# Patient Record
Sex: MALE | Race: WHITE | Employment: OTHER | URBAN - METROPOLITAN AREA
[De-identification: names, ages, dates, MRNs, and addresses within clinical notes are randomized per-mention and may not be internally consistent; named-entity substitution may affect disease eponyms.]

---

## 2017-06-26 ENCOUNTER — HOSPITAL ENCOUNTER (OUTPATIENT)
Dept: GENERAL RADIOLOGY | Age: 62
Discharge: HOME | End: 2017-06-26
Payer: COMMERCIAL

## 2017-06-26 DIAGNOSIS — M54.2 NECK PAIN: ICD-10-CM

## 2017-06-26 PROCEDURE — 72050 X-RAY EXAM NECK SPINE 4/5VWS: CPT

## 2023-05-10 DIAGNOSIS — M54.50 LOW BACK PAIN, UNSPECIFIED BACK PAIN LATERALITY, UNSPECIFIED CHRONICITY, UNSPECIFIED WHETHER SCIATICA PRESENT: ICD-10-CM

## 2023-05-10 PROBLEM — M48.061 LUMBAR STENOSIS: Status: ACTIVE | Noted: 2023-05-10

## 2023-05-10 PROBLEM — M54.16 LUMBAR RADICULOPATHY: Status: ACTIVE | Noted: 2023-05-10

## 2023-05-10 RX ORDER — MELOXICAM 15 MG/1
1 TABLET ORAL DAILY
COMMUNITY
End: 2023-05-10 | Stop reason: SDUPTHER

## 2023-05-10 RX ORDER — MELOXICAM 15 MG/1
15 TABLET ORAL DAILY
Qty: 30 TABLET | Refills: 0 | Status: SHIPPED | OUTPATIENT
Start: 2023-05-10 | End: 2023-08-18

## 2023-07-06 ENCOUNTER — HOSPITAL ENCOUNTER (OUTPATIENT)
Dept: DATA CONVERSION | Facility: HOSPITAL | Age: 68
End: 2023-07-06
Attending: PAIN MEDICINE | Admitting: PAIN MEDICINE
Payer: MEDICARE

## 2023-07-06 DIAGNOSIS — M54.16 RADICULOPATHY, LUMBAR REGION: ICD-10-CM

## 2023-07-28 ENCOUNTER — OFFICE VISIT (OUTPATIENT)
Dept: PRIMARY CARE | Facility: CLINIC | Age: 68
End: 2023-07-28
Payer: MEDICARE

## 2023-07-28 VITALS
BODY MASS INDEX: 32.37 KG/M2 | WEIGHT: 239 LBS | DIASTOLIC BLOOD PRESSURE: 76 MMHG | SYSTOLIC BLOOD PRESSURE: 120 MMHG | OXYGEN SATURATION: 93 % | HEIGHT: 72 IN | HEART RATE: 63 BPM

## 2023-07-28 DIAGNOSIS — I10 ESSENTIAL HYPERTENSION: ICD-10-CM

## 2023-07-28 DIAGNOSIS — I77.810 AORTIC ROOT DILATATION (CMS-HCC): ICD-10-CM

## 2023-07-28 DIAGNOSIS — C67.9 MALIGNANT NEOPLASM OF URINARY BLADDER, UNSPECIFIED SITE (MULTI): ICD-10-CM

## 2023-07-28 DIAGNOSIS — E78.5 HYPERLIPIDEMIA, UNSPECIFIED HYPERLIPIDEMIA TYPE: Primary | ICD-10-CM

## 2023-07-28 PROBLEM — E29.1 HYPOGONADISM, MALE: Status: ACTIVE | Noted: 2023-07-28

## 2023-07-28 PROBLEM — B00.9 HERPES SIMPLEX: Status: ACTIVE | Noted: 2023-07-28

## 2023-07-28 PROBLEM — K21.9 GASTROESOPHAGEAL REFLUX DISEASE: Status: ACTIVE | Noted: 2023-07-28

## 2023-07-28 PROBLEM — H35.52 RETINITIS PIGMENTOSA: Status: ACTIVE | Noted: 2023-07-28

## 2023-07-28 PROBLEM — H54.8 LEGAL BLINDNESS, AS DEFINED IN USA: Status: ACTIVE | Noted: 2023-07-28

## 2023-07-28 PROBLEM — H26.9 CATARACT, BILATERAL: Status: ACTIVE | Noted: 2023-07-28

## 2023-07-28 PROBLEM — I35.1 NONRHEUMATIC AORTIC VALVE INSUFFICIENCY: Status: ACTIVE | Noted: 2023-07-28

## 2023-07-28 PROBLEM — M47.816 DEGENERATIVE ARTHRITIS OF LUMBAR SPINE: Status: ACTIVE | Noted: 2023-07-28

## 2023-07-28 PROBLEM — N52.9 ERECTILE DYSFUNCTION: Status: ACTIVE | Noted: 2023-07-28

## 2023-07-28 PROBLEM — H33.309 TEAR OF RETINA WITHOUT DETACHMENT: Status: ACTIVE | Noted: 2023-07-28

## 2023-07-28 PROBLEM — N32.89 BLADDER MASS: Status: ACTIVE | Noted: 2023-07-28

## 2023-07-28 PROBLEM — M17.12 LEFT KNEE DJD: Status: ACTIVE | Noted: 2023-07-28

## 2023-07-28 PROBLEM — K44.9 HIATAL HERNIA: Status: ACTIVE | Noted: 2023-07-28

## 2023-07-28 PROBLEM — G03.9 MENINGITIS (HHS-HCC): Status: ACTIVE | Noted: 2023-07-28

## 2023-07-28 PROBLEM — R55 SYNCOPE: Status: ACTIVE | Noted: 2023-07-28

## 2023-07-28 PROBLEM — I51.9 MILD DIASTOLIC DYSFUNCTION: Status: ACTIVE | Noted: 2023-07-28

## 2023-07-28 PROBLEM — N40.0 BPH (BENIGN PROSTATIC HYPERPLASIA): Status: ACTIVE | Noted: 2023-07-28

## 2023-07-28 PROCEDURE — 3078F DIAST BP <80 MM HG: CPT | Performed by: FAMILY MEDICINE

## 2023-07-28 PROCEDURE — 1159F MED LIST DOCD IN RCRD: CPT | Performed by: FAMILY MEDICINE

## 2023-07-28 PROCEDURE — 3074F SYST BP LT 130 MM HG: CPT | Performed by: FAMILY MEDICINE

## 2023-07-28 PROCEDURE — 99214 OFFICE O/P EST MOD 30 MIN: CPT | Performed by: FAMILY MEDICINE

## 2023-07-28 PROCEDURE — 1126F AMNT PAIN NOTED NONE PRSNT: CPT | Performed by: FAMILY MEDICINE

## 2023-07-28 RX ORDER — ALFUZOSIN HYDROCHLORIDE 10 MG/1
1 TABLET, EXTENDED RELEASE ORAL DAILY
COMMUNITY
Start: 2023-02-07 | End: 2023-11-07 | Stop reason: SDUPTHER

## 2023-07-28 RX ORDER — NITROFURANTOIN MACROCRYSTALS 50 MG/1
CAPSULE ORAL EVERY 12 HOURS
COMMUNITY
Start: 2023-01-27 | End: 2024-02-07 | Stop reason: WASHOUT

## 2023-07-28 RX ORDER — PRAVASTATIN SODIUM 10 MG/1
1 TABLET ORAL NIGHTLY
COMMUNITY
Start: 2023-02-01 | End: 2024-02-22 | Stop reason: SDUPTHER

## 2023-07-28 RX ORDER — AMLODIPINE BESYLATE 5 MG/1
5 TABLET ORAL 2 TIMES DAILY
COMMUNITY
End: 2024-05-22 | Stop reason: SDUPTHER

## 2023-07-28 RX ORDER — LISINOPRIL 20 MG/1
20 TABLET ORAL 2 TIMES DAILY
Qty: 180 TABLET | Refills: 3
Start: 2023-07-28 | End: 2024-02-07 | Stop reason: WASHOUT

## 2023-07-28 RX ORDER — EZETIMIBE 10 MG/1
10 TABLET ORAL DAILY
COMMUNITY
End: 2023-08-25 | Stop reason: SDUPTHER

## 2023-07-28 RX ORDER — CHLORTHALIDONE 25 MG/1
TABLET ORAL DAILY
COMMUNITY
Start: 2022-08-01 | End: 2024-02-07 | Stop reason: WASHOUT

## 2023-07-28 RX ORDER — SILDENAFIL 100 MG/1
1 TABLET, FILM COATED ORAL
COMMUNITY
Start: 2014-09-22

## 2023-07-28 RX ORDER — ALBUTEROL SULFATE 90 UG/1
2 AEROSOL, METERED RESPIRATORY (INHALATION) EVERY 4 HOURS PRN
COMMUNITY
Start: 2022-12-07

## 2023-07-28 RX ORDER — PREDNISONE 20 MG/1
TABLET ORAL
COMMUNITY
Start: 2022-12-07 | End: 2023-07-28

## 2023-07-28 RX ORDER — TIZANIDINE 4 MG/1
4 TABLET ORAL 3 TIMES DAILY
COMMUNITY
Start: 2022-12-04 | End: 2024-02-07 | Stop reason: WASHOUT

## 2023-07-28 RX ORDER — OMEPRAZOLE 20 MG/1
20 CAPSULE, DELAYED RELEASE ORAL
COMMUNITY
End: 2023-11-20 | Stop reason: SDUPTHER

## 2023-07-28 RX ORDER — ASPIRIN 81 MG/1
TABLET ORAL
COMMUNITY
Start: 2022-05-16

## 2023-07-28 RX ORDER — LISINOPRIL 40 MG/1
40 TABLET ORAL DAILY
COMMUNITY
End: 2023-07-28 | Stop reason: SDUPTHER

## 2023-07-28 ASSESSMENT — ENCOUNTER SYMPTOMS
BACK PAIN: 1
FEVER: 0
HYPERTENSION: 1
COUGH: 0

## 2023-07-28 NOTE — PROGRESS NOTES
Subjective   Patient ID: Florentino Mccall is a 67 y.o. male who presents for Hypertension.    Hypertension  This is a recurrent problem. The current episode started more than 1 month ago. The problem is unchanged. Pertinent negatives include no chest pain. There are no compliance problems.         Here today for follow-up on hypertension hyperlipidemia  Currently taking lisinopril 20 mg twice daily and amlodipine 5 mg twice daily.  He monitors his blood pressure regularly out of the office.  BP readings can be variable, but most recent reading was 125/74.  He will sometimes not take his second dose of amlodipine when his blood pressure is on the lower side  He is currently taking ezetimibe and pravastatin 10 mg daily for hyperlipidemia.  His last lipid panel done 1/31/2023 showed total cholesterol 220 and .  He was started on pravastatin at that time.  He had previously taken atorvastatin and rosuvastatin, but these medications were stopped because he had developed muscle cramping when taking  He is following with pain management for chronic low back pain.  He received an injection in his lower back earlier this month which helped    Review of Systems   Constitutional:  Negative for fever.   Respiratory:  Negative for cough.    Cardiovascular:  Negative for chest pain.   Musculoskeletal:  Positive for back pain.       Objective   /76   Pulse 63   Ht 1.829 m (6')   Wt 108 kg (239 lb)   SpO2 93%   BMI 32.41 kg/m²     Physical Exam  Vitals reviewed.   Constitutional:       General: He is not in acute distress.     Appearance: Normal appearance. He is well-developed.   HENT:      Head: Normocephalic.   Eyes:      Conjunctiva/sclera: Conjunctivae normal.   Cardiovascular:      Rate and Rhythm: Normal rate and regular rhythm.      Heart sounds: Normal heart sounds.   Pulmonary:      Effort: Pulmonary effort is normal.      Breath sounds: Normal breath sounds.   Musculoskeletal:      Right lower leg: No edema.       Left lower leg: No edema.   Skin:     Findings: No rash.   Neurological:      Mental Status: He is alert.   Psychiatric:         Mood and Affect: Mood normal.         Behavior: Behavior normal.         Assessment/Plan   Problem List Items Addressed This Visit          Medium    Aortic root dilatation (CMS/HCC)     Follows with cardiology         Essential hypertension    Relevant Medications    lisinopril 20 mg tablet    Hyperlipidemia - Primary    Relevant Orders    Lipid Panel    Comprehensive Metabolic Panel    Malignant tumor of urinary bladder (CMS/HCC)     Follows with urology        #1 hypertension: This is stable and adequately controlled on lisinopril and amlodipine.  Continue current dose and follow-up in January when due for next annual Medicare physical.  Blood pressures well controlled today at 120/76  2.  Hyperlipidemia: His last lipid panel was suboptimal, and he has since been started on pravastatin.  We will recheck a lipid panel.  Continue ezetimibe and pravastatin at current dose

## 2023-08-09 ENCOUNTER — LAB (OUTPATIENT)
Dept: LAB | Facility: LAB | Age: 68
End: 2023-08-09
Payer: MEDICARE

## 2023-08-09 DIAGNOSIS — E78.5 HYPERLIPIDEMIA, UNSPECIFIED HYPERLIPIDEMIA TYPE: ICD-10-CM

## 2023-08-09 LAB
ALANINE AMINOTRANSFERASE (SGPT) (U/L) IN SER/PLAS: 29 U/L (ref 10–52)
ALBUMIN (G/DL) IN SER/PLAS: 4.7 G/DL (ref 3.4–5)
ALKALINE PHOSPHATASE (U/L) IN SER/PLAS: 42 U/L (ref 33–136)
ANION GAP IN SER/PLAS: 15 MMOL/L (ref 10–20)
ASPARTATE AMINOTRANSFERASE (SGOT) (U/L) IN SER/PLAS: 20 U/L (ref 9–39)
BILIRUBIN TOTAL (MG/DL) IN SER/PLAS: 0.7 MG/DL (ref 0–1.2)
CALCIUM (MG/DL) IN SER/PLAS: 10 MG/DL (ref 8.6–10.3)
CARBON DIOXIDE, TOTAL (MMOL/L) IN SER/PLAS: 23 MMOL/L (ref 21–32)
CHLORIDE (MMOL/L) IN SER/PLAS: 106 MMOL/L (ref 98–107)
CHOLESTEROL (MG/DL) IN SER/PLAS: 176 MG/DL (ref 0–199)
CHOLESTEROL IN HDL (MG/DL) IN SER/PLAS: 58.4 MG/DL
CHOLESTEROL/HDL RATIO: 3
CREATININE (MG/DL) IN SER/PLAS: 1.19 MG/DL (ref 0.5–1.3)
GFR MALE: 67 ML/MIN/1.73M2
GLUCOSE (MG/DL) IN SER/PLAS: 103 MG/DL (ref 74–99)
LDL: 105 MG/DL (ref 0–99)
POTASSIUM (MMOL/L) IN SER/PLAS: 4.6 MMOL/L (ref 3.5–5.3)
PROTEIN TOTAL: 7.1 G/DL (ref 6.4–8.2)
SODIUM (MMOL/L) IN SER/PLAS: 139 MMOL/L (ref 136–145)
TRIGLYCERIDE (MG/DL) IN SER/PLAS: 64 MG/DL (ref 0–149)
UREA NITROGEN (MG/DL) IN SER/PLAS: 24 MG/DL (ref 6–23)
VLDL: 13 MG/DL (ref 0–40)

## 2023-08-09 PROCEDURE — 80061 LIPID PANEL: CPT

## 2023-08-09 PROCEDURE — 80053 COMPREHEN METABOLIC PANEL: CPT

## 2023-08-09 PROCEDURE — 36415 COLL VENOUS BLD VENIPUNCTURE: CPT

## 2023-08-11 ENCOUNTER — TELEPHONE (OUTPATIENT)
Dept: PRIMARY CARE | Facility: CLINIC | Age: 68
End: 2023-08-11
Payer: MEDICARE

## 2023-08-11 NOTE — TELEPHONE ENCOUNTER
Pt returned call I relayed lab results and pt states would like a call back to discuss his kidney levels.

## 2023-08-18 DIAGNOSIS — M54.50 LOW BACK PAIN, UNSPECIFIED BACK PAIN LATERALITY, UNSPECIFIED CHRONICITY, UNSPECIFIED WHETHER SCIATICA PRESENT: ICD-10-CM

## 2023-08-18 RX ORDER — MELOXICAM 15 MG/1
15 TABLET ORAL DAILY
Qty: 30 TABLET | Refills: 0 | Status: SHIPPED | OUTPATIENT
Start: 2023-08-18 | End: 2023-09-18

## 2023-08-18 NOTE — TELEPHONE ENCOUNTER
Rx Refill Request Telephone Encounter    Name:  Florentino Mccall  :  [id number]  Medication Name:  Meloxicam 15 mg

## 2023-08-25 DIAGNOSIS — E78.5 HYPERLIPIDEMIA, UNSPECIFIED HYPERLIPIDEMIA TYPE: ICD-10-CM

## 2023-08-25 RX ORDER — EZETIMIBE 10 MG/1
10 TABLET ORAL DAILY
Qty: 90 TABLET | Refills: 3 | Status: SHIPPED | OUTPATIENT
Start: 2023-08-25

## 2023-09-18 DIAGNOSIS — M54.50 LOW BACK PAIN, UNSPECIFIED BACK PAIN LATERALITY, UNSPECIFIED CHRONICITY, UNSPECIFIED WHETHER SCIATICA PRESENT: ICD-10-CM

## 2023-09-18 RX ORDER — MELOXICAM 15 MG/1
15 TABLET ORAL DAILY
Qty: 30 TABLET | Refills: 0 | Status: SHIPPED | OUTPATIENT
Start: 2023-09-18 | End: 2023-10-26

## 2023-09-29 VITALS — BODY MASS INDEX: 32.58 KG/M2 | WEIGHT: 240.52 LBS | HEIGHT: 72 IN

## 2023-10-02 NOTE — OP NOTE
PROCEDURE DETAILS    Preoperative Diagnosis:  Lumbar radicular pain, M54.16    Postoperative Diagnosis:  Lumbar radicular pain, M54.16    Surgeon: Jyoti Joshua  Resident/Fellow/Other Assistant: Mitali Espinoza    Procedure:  Lumbar epidural steroid injection under fluoroscopic guidance     Anesthesia: No anesthesiologist associated with this case  Estimated Blood Loss: 0  Findings: None         Operative Report:       Operation  Midline lumbar epidural steroid injection. Level performed L5-s1    Surgical indications  The patient is here today to receive an epidural steroid injection to assist with pain.    Operative report  The patient was taken to the procedure room, was placed into a prone positioning. The lumbar area was prepped and draped sterilely in the normal fashion. Under fluoroscopic guidance the epidural space was identified. The skin and subcutaneous tissue was  anesthetized with 1% lidocaine. An 18-gauge Tuohy needle was introduced using the normal saline loss-of-resistance technique. Once the loss of resistance had been achieved, final positioning of the needle was confirmed with negative aspiration of heme  and CSF, with the injection of contrast material showing spread in the epidural space, confirmed in AP and lateral view. Then the patient received 80 mg of Depo-Medrol diluted into normal saline preservative-free and 2 mL of 0.25% bupivacaine making total  volume of 8 mL. The patient tolerated the procedure well, was taken to the recovery room, allowed to recover sufficient amount of time and then was discharged home in stable condition. The patient was advised to [facility]up with [facility]  to reassess improvement on as-needed basis.    Thank you for allowing me to participate in the care of this patient.                        Attestation:   Note Completion:  Attending Attestation I performed the procedure without a resident         Electronic Signatures:  Tres  Jyoti ANTHONY)  (Signed 06-Jul-2023 11:08)   Authored: Post-Operative Note, Chart Review, Note Completion      Last Updated: 06-Jul-2023 11:08 by Jyoti Joshua)

## 2023-10-11 ENCOUNTER — HOSPITAL ENCOUNTER (OUTPATIENT)
Dept: RADIOLOGY | Facility: HOSPITAL | Age: 68
Discharge: HOME | End: 2023-10-11
Payer: MEDICARE

## 2023-10-11 ENCOUNTER — LAB (OUTPATIENT)
Dept: LAB | Facility: LAB | Age: 68
End: 2023-10-11
Payer: MEDICARE

## 2023-10-11 DIAGNOSIS — R31.9 HEMATURIA, UNSPECIFIED: ICD-10-CM

## 2023-10-11 DIAGNOSIS — N40.0 BENIGN PROSTATIC HYPERPLASIA WITHOUT LOWER URINARY TRACT SYMPTOMS: ICD-10-CM

## 2023-10-11 DIAGNOSIS — R31.9 HEMATURIA, UNSPECIFIED: Primary | ICD-10-CM

## 2023-10-11 DIAGNOSIS — C67.9 MALIGNANT NEOPLASM OF BLADDER, UNSPECIFIED (MULTI): ICD-10-CM

## 2023-10-11 LAB
ALBUMIN SERPL BCP-MCNC: 4.8 G/DL (ref 3.4–5)
ALP SERPL-CCNC: 43 U/L (ref 33–136)
ALT SERPL W P-5'-P-CCNC: 31 U/L (ref 10–52)
ANION GAP SERPL CALC-SCNC: 11 MMOL/L (ref 10–20)
APPEARANCE UR: CLEAR
AST SERPL W P-5'-P-CCNC: 21 U/L (ref 9–39)
BILIRUB SERPL-MCNC: 0.8 MG/DL (ref 0–1.2)
BILIRUB UR STRIP.AUTO-MCNC: NEGATIVE MG/DL
BUN SERPL-MCNC: 21 MG/DL (ref 6–23)
CALCIUM SERPL-MCNC: 9.9 MG/DL (ref 8.6–10.3)
CHLORIDE SERPL-SCNC: 106 MMOL/L (ref 98–107)
CO2 SERPL-SCNC: 28 MMOL/L (ref 21–32)
COLOR UR: YELLOW
CREAT SERPL-MCNC: 1.16 MG/DL (ref 0.5–1.3)
ERYTHROCYTE [DISTWIDTH] IN BLOOD BY AUTOMATED COUNT: 12.8 % (ref 11.5–14.5)
GFR SERPL CREATININE-BSD FRML MDRD: 70 ML/MIN/1.73M*2
GLUCOSE SERPL-MCNC: 103 MG/DL (ref 74–99)
GLUCOSE UR STRIP.AUTO-MCNC: NEGATIVE MG/DL
HCT VFR BLD AUTO: 46.3 % (ref 41–52)
HGB BLD-MCNC: 15.4 G/DL (ref 13.5–17.5)
KETONES UR STRIP.AUTO-MCNC: NEGATIVE MG/DL
LEUKOCYTE ESTERASE UR QL STRIP.AUTO: NEGATIVE
MCH RBC QN AUTO: 29.3 PG (ref 26–34)
MCHC RBC AUTO-ENTMCNC: 33.3 G/DL (ref 32–36)
MCV RBC AUTO: 88 FL (ref 80–100)
NITRITE UR QL STRIP.AUTO: NEGATIVE
NRBC BLD-RTO: 0 /100 WBCS (ref 0–0)
PH UR STRIP.AUTO: 6 [PH]
PLATELET # BLD AUTO: 211 X10*3/UL (ref 150–450)
PMV BLD AUTO: 9.9 FL (ref 7.5–11.5)
POTASSIUM SERPL-SCNC: 5.1 MMOL/L (ref 3.5–5.3)
PROT SERPL-MCNC: 6.8 G/DL (ref 6.4–8.2)
PROT UR STRIP.AUTO-MCNC: NEGATIVE MG/DL
PSA SERPL-MCNC: 0.82 NG/ML
RBC # BLD AUTO: 5.26 X10*6/UL (ref 4.5–5.9)
RBC # UR STRIP.AUTO: NEGATIVE /UL
SODIUM SERPL-SCNC: 140 MMOL/L (ref 136–145)
SP GR UR STRIP.AUTO: 1.01
UROBILINOGEN UR STRIP.AUTO-MCNC: <2 MG/DL
WBC # BLD AUTO: 5.8 X10*3/UL (ref 4.4–11.3)

## 2023-10-11 PROCEDURE — 88112 CYTOPATH CELL ENHANCE TECH: CPT | Performed by: PATHOLOGY

## 2023-10-11 PROCEDURE — 76770 US EXAM ABDO BACK WALL COMP: CPT | Performed by: RADIOLOGY

## 2023-10-11 PROCEDURE — 36415 COLL VENOUS BLD VENIPUNCTURE: CPT

## 2023-10-11 PROCEDURE — 87086 URINE CULTURE/COLONY COUNT: CPT

## 2023-10-11 PROCEDURE — 85027 COMPLETE CBC AUTOMATED: CPT

## 2023-10-11 PROCEDURE — 81003 URINALYSIS AUTO W/O SCOPE: CPT

## 2023-10-11 PROCEDURE — 76770 US EXAM ABDO BACK WALL COMP: CPT

## 2023-10-11 PROCEDURE — 88112 CYTOPATH CELL ENHANCE TECH: CPT

## 2023-10-11 PROCEDURE — 84153 ASSAY OF PSA TOTAL: CPT

## 2023-10-11 PROCEDURE — 80053 COMPREHEN METABOLIC PANEL: CPT

## 2023-10-12 LAB
BACTERIA UR CULT: NORMAL
LABORATORY COMMENT REPORT: NORMAL
LABORATORY COMMENT REPORT: NORMAL
PATH REPORT.FINAL DX SPEC: NORMAL
PATH REPORT.GROSS SPEC: NORMAL
PATH REPORT.RELEVANT HX SPEC: NORMAL
PATH REPORT.TOTAL CANCER: NORMAL

## 2023-10-26 DIAGNOSIS — M54.50 LOW BACK PAIN, UNSPECIFIED BACK PAIN LATERALITY, UNSPECIFIED CHRONICITY, UNSPECIFIED WHETHER SCIATICA PRESENT: ICD-10-CM

## 2023-10-26 RX ORDER — MELOXICAM 15 MG/1
15 TABLET ORAL DAILY
Qty: 30 TABLET | Refills: 0 | Status: SHIPPED | OUTPATIENT
Start: 2023-10-26 | End: 2023-11-20

## 2023-11-05 PROBLEM — M54.12 CERVICAL RADICULOPATHY: Status: ACTIVE | Noted: 2023-11-05

## 2023-11-05 PROBLEM — T78.40XA ALLERGIC DRUG REACTION: Status: ACTIVE | Noted: 2023-11-05

## 2023-11-05 PROBLEM — L80 VITILIGO: Status: ACTIVE | Noted: 2023-11-05

## 2023-11-05 PROBLEM — R07.9 CHEST PAIN: Status: ACTIVE | Noted: 2023-11-05

## 2023-11-05 PROBLEM — M54.2 NECK PAIN: Status: ACTIVE | Noted: 2023-11-05

## 2023-11-05 PROBLEM — R06.83 SNORING: Status: ACTIVE | Noted: 2023-11-05

## 2023-11-05 PROBLEM — R39.15 URINARY URGENCY: Status: ACTIVE | Noted: 2023-11-05

## 2023-11-05 PROBLEM — Q39.4 ESOPHAGEAL WEB (HHS-HCC): Status: ACTIVE | Noted: 2023-11-05

## 2023-11-05 PROBLEM — R31.9 HEMATURIA: Status: ACTIVE | Noted: 2023-11-05

## 2023-11-05 RX ORDER — SODIUM CHLORIDE 5 %
OINTMENT (GRAM) OPHTHALMIC (EYE)
COMMUNITY

## 2023-11-05 RX ORDER — METHYLPREDNISOLONE 4 MG/1
TABLET ORAL
COMMUNITY
Start: 2022-12-04 | End: 2024-02-07 | Stop reason: WASHOUT

## 2023-11-05 RX ORDER — AMLODIPINE BESYLATE 2.5 MG/1
TABLET ORAL
COMMUNITY
Start: 2022-02-01 | End: 2024-02-07 | Stop reason: WASHOUT

## 2023-11-05 RX ORDER — LISINOPRIL 40 MG/1
40 TABLET ORAL
COMMUNITY
Start: 2022-09-01 | End: 2024-01-15

## 2023-11-07 ENCOUNTER — PROCEDURE VISIT (OUTPATIENT)
Dept: UROLOGY | Facility: CLINIC | Age: 68
End: 2023-11-07
Payer: MEDICARE

## 2023-11-07 VITALS
BODY MASS INDEX: 32.97 KG/M2 | SYSTOLIC BLOOD PRESSURE: 155 MMHG | HEART RATE: 70 BPM | WEIGHT: 243.39 LBS | DIASTOLIC BLOOD PRESSURE: 81 MMHG | RESPIRATION RATE: 18 BRPM | HEIGHT: 72 IN

## 2023-11-07 DIAGNOSIS — N40.0 BENIGN PROSTATIC HYPERPLASIA, UNSPECIFIED WHETHER LOWER URINARY TRACT SYMPTOMS PRESENT: ICD-10-CM

## 2023-11-07 DIAGNOSIS — C67.9 MALIGNANT NEOPLASM OF URINARY BLADDER, UNSPECIFIED SITE (MULTI): ICD-10-CM

## 2023-11-07 DIAGNOSIS — N41.0 ACUTE PROSTATITIS: ICD-10-CM

## 2023-11-07 DIAGNOSIS — R35.1 BENIGN PROSTATIC HYPERPLASIA WITH NOCTURIA: ICD-10-CM

## 2023-11-07 DIAGNOSIS — R39.15 URINARY URGENCY: Primary | ICD-10-CM

## 2023-11-07 DIAGNOSIS — N40.1 BENIGN PROSTATIC HYPERPLASIA WITH NOCTURIA: ICD-10-CM

## 2023-11-07 LAB
POC APPEARANCE, URINE: CLEAR
POC BILIRUBIN, URINE: NEGATIVE
POC BLOOD, URINE: NEGATIVE
POC COLOR, URINE: YELLOW
POC GLUCOSE, URINE: NEGATIVE MG/DL
POC KETONES, URINE: NEGATIVE MG/DL
POC LEUKOCYTES, URINE: NEGATIVE
POC NITRITE,URINE: NEGATIVE
POC PH, URINE: 6.5 PH
POC PROTEIN, URINE: NEGATIVE MG/DL
POC SPECIFIC GRAVITY, URINE: 1.01
POC UROBILINOGEN, URINE: 0.2 EU/DL

## 2023-11-07 PROCEDURE — 51741 ELECTRO-UROFLOWMETRY FIRST: CPT | Performed by: UROLOGY

## 2023-11-07 PROCEDURE — 51798 US URINE CAPACITY MEASURE: CPT | Performed by: UROLOGY

## 2023-11-07 PROCEDURE — 81003 URINALYSIS AUTO W/O SCOPE: CPT | Performed by: UROLOGY

## 2023-11-07 PROCEDURE — 99214 OFFICE O/P EST MOD 30 MIN: CPT | Performed by: UROLOGY

## 2023-11-07 PROCEDURE — 52000 CYSTOURETHROSCOPY: CPT | Performed by: UROLOGY

## 2023-11-07 RX ORDER — ALFUZOSIN HYDROCHLORIDE 10 MG/1
10 TABLET, EXTENDED RELEASE ORAL DAILY
Qty: 90 TABLET | Refills: 3 | Status: SHIPPED | OUTPATIENT
Start: 2023-11-07 | End: 2024-01-15

## 2023-11-07 RX ORDER — CIPROFLOXACIN 500 MG/1
500 TABLET ORAL 2 TIMES DAILY
Qty: 30 TABLET | Refills: 0 | Status: SHIPPED | OUTPATIENT
Start: 2023-11-07 | End: 2023-11-22

## 2023-11-07 ASSESSMENT — PAIN SCALES - GENERAL: PAINLEVEL: 0-NO PAIN

## 2023-11-07 NOTE — PROGRESS NOTES
Provider Impressions     67 year-old argumentative white male, with his wife, referred by Dr. Cleaning for a BLADDER MASS and GROSS HEMATURIA. A CAT scan of the abdomen and pelvis on 06/05/17, identified a question of a 1.3 cm BLADDER MASS. versus PROSTATIC ENLARGEMENT at the base of the bladder. Previous PSA on 05/18/17 was 0.9. Patient is a retired . He has no family history of prostate or breast cancer. He has a 45-pack-year cigarette smoking history.     07/01/17, unfortunately, the patient was under the impression he was to have all testing performed today including his cystoscopy. Urinalysis shows 10 red blood cells, urine culture is negative and urine cytology is negative for malignant cells. Patient describes significant URINARY URGENCY, NOCTURIA Ã--4, and URINARY HESITANCY. He is very unhappy with his urinary pattern and also the fact that there is a question of a growth within his bladder. We did review the possibilities including an enlarged prostate, a benign lesion or polyp, and bladder cancer which would relate to his cigarette smoking history. In any case, we have scheduled him for cystoscopy with urodynamic studies. He will benefit from both an alpha agents and antimuscarinic medications. Of course, we will evaluate for bladder tumor.     08/02/17, CYSTOSCOPY with URODYNAMIC performed. URODYNAMICS reveals a HYPERREFLEXIC BLADDER and PROSTATIC OBSTRUCTION. We will initiate Flomax and Myrbetric at the 25 mg dose. In addition, significant PROSTATITIS was seen and we will initiate a two-week course of ciprofloxacin 500 mg by mouth every 12 hours. 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 The remainder of the bladder is clean. He will undergo a TURBT with placement of a right ureteral catheter on 08/17/17. Dr. Cleaning will provide preoperative medical risk stratification.     08/17/17, TURBT, OR,  BLADDER CANCER, stage TA low grade disease, no therapy.     11/03/17, now 3 months status post his diagnosis of BLADDER CANCER. CYSTOSCOPY today is negative. Cytology is negative. Urine cultures negative. Creatinine 1.24. PSA 0.8. IVP is negative. He will return in 3 months. He will continue on Flomax and Myrbetric.     02/20/18, now 6 months status post his last BLADDER CANCER, stage TA low grade, no therapy. Today CYSTOSCOPY is negative. Cytology is negative. Urinalysis and urine culture negative. PSA is 0.7. He will maintain Flomax and Myrbetric. He will return in 3 months.     05/21/18, now 9 months status post his last BLADDER CANCER, stage TA low grade disease, no therapy. Today's surveillance CYSTOSCOPY is negative for recurrent tumor. Cytology is negative for malignant cells. He will return in 3 months. He will continue on Flomax and Myrbetric at the 25 mg dose.     08/24/18, now 1 year status post his last BLADDER CANCER, stage TA low grade disease, no therapy. Today's SURVEILLANCE CYSTOSCOPY is negative for recurrent tumor. Cytology is negative for malignant cells. He will return in 3 months. He will continue on Flomax and Myrbetric at the 25 mg dose.     11/20/18, now 1 year and 3 months status post his last BLADDER CANCER, stage TA low grade disease, no therapy. Today's surveillance CYSTOSCOPY is negative for recurrent tumor. Flow rate of 6 with a PVR 37. He has discontinued his Myrbetric. He continues on Flomax daily. Urinalysis, urine culture, urine cytology are all negative. PSA is 1.2. IVP is negative for any filling defects. He will return in 3 months.     03/30/19, now 1 year and 7 months status post his last Bladder Cancer, stage TA low grade disease, no therapy. Today's SURVEILLANCE CYSTOSCOPY is negative for recurrent tumor. He continues on daily Flomax. He will return in 3 months.     07/01/19, now 1 year and 11 months status post his last bladder cancer, stage TA low grade disease, no therapy.  Today's surveillance cystoscopy is negative for recurrent tumor. It was uncomfortable at the prostatic urethra and we will transition to 10 minutes of lidocaine. He is also changed from Flomax to daily Uroxatral 10 mg. He will return in 3 months. Flow rate of 8 with a PVR of 12.     August 2019, 66-year-old brother underwent radical prostatectomy for prostate cancer     10/04/19, now 2 years and 2 months status post his last bladder cancer, stage TA low grade disease, no therapy. Today's cystoscopy is negative for recurrent tumor. He continues on daily Uroxatral. Flow rate of 24 with a PVR 65. He will return in 3 months for his 1 year visit with appropriate laboratories visit.     02/05/20, cystoscopy today does not reveal any evidence of recurrent tumor. Flow rate of 5 cc/s with a PVR 16 cc. Now 2 years and 6 months status post his last bladder cancer, stage TA, low-grade disease, no therapy. He continues on daily Uroxatral. Urinalysis, urine culture and urine cytology are negative PSA 0.8. Hematocrit 46%. IVP negative. Creatinine 1.3. The patient's brother, Greg, was diagnosed with Aubrie 7 prostate cancer on 08/14/19 and underwent a radical retropubic prostatectomy. The patient's brother Greg presenting PSA was 2.95. He will now be on a 6 month schedule.     06/29/20, cystoscopy today does not reveal any evidence of recurrent tumor. Flow rate of 5 cc/s with a PVR of 100 cc. He is now 2 years and 10 months status post his last bladder cancer, stage TA, low-grade disease with no therapy. He continues on daily Uroxatral. Creatinine is 1.35 in stable. We will see him again in November.     11/30/20, cystoscopy today does not reveal any evidence of recurrent tumor. Flow rate of 6 cc/s with a PVR of 61 cc. He is now 3 years and 3 months status post his last bladder cancer, stage TA low grade disease with no therapy. He continues on Uroxatral. IVP is negative for mass or stones. Creatinine 1.20. Hematocrit 45%. PSA  1.0. Urinalysis no blood, urine culture no growth, urine cytology is negative for malignant cells. He will return in 6 months     May 24, 2021, cystoscopy today once again does not reveal any evidence of recurrent tumor. Some prostatitis today. Flow rate 21 cc/s, total volume 183 cc, PVR 26 cc. He is on daily Uroxatrol. He is now 3 years and 9 months status post his last bladder cancer, stage TA low-grade disease, with no therapy. He will return in 6 months.     November 17, 2021, cystoscopy today does not reveal any evidence of recurrent tumor. Flow rate 15 cc/s, total volume 110 cc, PVR 35 cc. He continues on daily Uroxatrol. He is now 4 years status post his last bladder cancer, stage TA low-grade disease, no therapy. IVP does not show any suspicious findings of upper tract urothelial cancer. Urine culture no growth. Urinalysis no blood. Urine cytology is lacking atypia. PSA 0.80. Creatinine 1.26. Hematocrit 44.7%. He will return in 1 year.     February 7, 2023, cystoscopy today does not reveal any evidence of recurrent bladder cancer. Flow rate of 13 cc/s, total volume 94 cc, PVR 18 cc. He continues on Uroxatrol with excellent results. He is now 5 years status post his last bladder cancer, stage TA low-grade disease, no therapy. Renal ultrasound does not show any suspicious lesions or hydronephrosis. Urinalysis shows no blood, urine culture no growth, urine cytology saw 1 area of atypical cells which could be from a neoplastic process. Creatinine 1.10. PSA 1.05. Hematocrit 43.8%. He will return in 1 year.    November 7, 2023, cystoscopy today does not reveal any evidence of recurrent bladder tumor or cancer.  Now 5 years status post his last bladder cancer, stage TA low-grade disease, no therapy.  Patient continues on Uroxatrol with excellent results.  Flow rate of 16 cc/s with a PVR of 10 cc.  Creatinine 1.16.  Hematocrit 46.3%.  PSA 0.82.  Urine culture no growth.  Urine cytology is atypical cannot rule out  neoplasm.  Renal ultrasound is unremarkable.  He will return in 1 year.        PLAN:     #1 patient will continue Uroxatral 10 mg by mouth daily      #2 patient will return for cystoscopy, flow rate and PVR, urine studies, blood studies, PSA, renal ultrasound in November 2024.  2 separate LIDOCAINE gel 10 minutes PRIOR.    Physical Exam  Vitals and nursing note reviewed. Exam conducted with a chaperone present.   Constitutional:       Appearance: Normal appearance.   HENT:      Head: Normocephalic and atraumatic.   Pulmonary:      Effort: Pulmonary effort is normal.   Abdominal:      Palpations: Abdomen is soft.      Tenderness: There is no abdominal tenderness.   Genitourinary:     Penis: Normal.       Testes: Normal.   Musculoskeletal:         General: Normal range of motion.      Cervical back: Normal range of motion and neck supple.   Neurological:      General: No focal deficit present.      Mental Status: He is alert and oriented to person, place, and time.   Psychiatric:         Mood and Affect: Mood normal.         Behavior: Behavior normal.         This note was created with voice-recognition software and was not corrected for typographical or grammatical errors.

## 2023-11-07 NOTE — PATIENT INSTRUCTIONS
Patient Discussion/Summary     It was nice to see you once again. Your cystoscopy does not reveal any evidence of recurrence. It is now 5 years since your original diagnosis of bladder cancer. Your flow rate is excellent with very little reresidual on daily Uroxatrol. Your kidney ultrasound did not show any spread of the cancer to the kidneys.  Urine culture did not show any infection.  We will see you again in November      This note was created with voice-recognition software and was not corrected for typographical or grammatical errors

## 2023-11-07 NOTE — PROGRESS NOTES
"Patient ID: Rajinder Mccall is a 67 y.o. male.  Pt denies any pain today. He states he had lumbar epidural in July with Dr. Tellez. No concerns about falling or safety.  JML   He denies any urologic complaints today.     Procedures  Pt took macrodantin 50mg po as prescribed  Anesthesia: Local 2% Lidocaine  Instruments: 6F flexible disposable cystoscope  Pt brought to procedure room and placed in supine lithotomy position. Pt draped and prepped in normal sterile fashion. 10ml lidocaine instilled into urethral meatus. Pt tolerated well    I was present as chaperone for the entirety of the procedure Hui Francois  Cystoscopy performed by Dr. Niko aJramillo        Bedside \"Time Out\" Verification   Today's Date: 11/07/2023 I attest that this time out verification took place prior to the procedure.   Procedure: cysto   RN/LPN/MA:GOLD   Provider: [facility].   Verified By: RN/JAUNN/MA, RAJINDER LEHMANMARIBEL and Provider.   Prior to the start of the procedure a time out was taken and the following were verified: the identity of the patient using two patient identifiers, the correct procedure, the correct site marked as indicated, the correct positioning for the patient and the correct equipment was obtained.   Cystoscopy - male RAJINDER MCCALL identified using two (2) forms of identification.   Procedure: diagnostic cystourethroscopy.  Procedure Note: Time Started: 10:30am. Time Completed: 10:48 AM  Indications for procedure: irritable voiding symptoms. surveillance TCC.   Discussed with patient: Risks, benefits, and alternative were discussed in detail. Patient appears to understand and agrees to proceed. Patient has signed the procedure consent form.    CYSTOSCOPY:    Cystoscopy today reveals a normal distal urethra and external sphincter.  Prostatic urethra was moderately obstructed and the median lobe is mildly elevated.  Once inside the bladder, full examination did not identify any evidence of stones, lesions or recurrent tumor.  Flow rate of 16 " cc/s, total volume 118 cc, PVR 10 cc.

## 2023-11-07 NOTE — LETTER
November 7, 2023     Ed Tomlin DO  [address]  [place]  [facility] [address]    Patient: Florentino Mccall   YOB: 1955   Date of Visit: 11/7/2023       Dear Dr. Ed Tomlin DO:    Thank you for referring Florentino Mccall to me for evaluation. Below are my notes for this consultation.  If you have questions, please do not hesitate to call me. I look forward to following your patient along with you.       Sincerely,     Niko Jaramillo MD      CC: No Recipients  ______________________________________________________________________________________      Provider Impressions     67 year-old argumentative white male, with his wife, referred by Dr. Cleaning for a BLADDER MASS and GROSS HEMATURIA. A CAT scan of the abdomen and pelvis on 06/05/17, identified a question of a 1.3 cm BLADDER MASS. versus PROSTATIC ENLARGEMENT at the base of the bladder. Previous PSA on 05/18/17 was 0.9. Patient is a retired . He has no family history of prostate or breast cancer. He has a 45-pack-year cigarette smoking history.     07/01/17, unfortunately, the patient was under the impression he was to have all testing performed today including his cystoscopy. Urinalysis shows 10 red blood cells, urine culture is negative and urine cytology is negative for malignant cells. Patient describes significant URINARY URGENCY, NOCTURIA Ã--4, and URINARY HESITANCY. He is very unhappy with his urinary pattern and also the fact that there is a question of a growth within his bladder. We did review the possibilities including an enlarged prostate, a benign lesion or polyp, and bladder cancer which would relate to his cigarette smoking history. In any case, we have scheduled him for cystoscopy with urodynamic studies. He will benefit from both an alpha agents and antimuscarinic medications. Of course, we will evaluate for bladder tumor.     08/02/17, CYSTOSCOPY with URODYNAMIC performed. URODYNAMICS reveals a HYPERREFLEXIC BLADDER  and PROSTATIC OBSTRUCTION. We will initiate Flomax and Myrbetric at the 25 mg dose. In addition, significant PROSTATITIS was seen and we will initiate a two-week course of ciprofloxacin 500 mg by mouth every 12 hours. 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 The remainder of the bladder is clean. He will undergo a TURBT with placement of a right ureteral catheter on 08/17/17. Dr. Cleaning will provide preoperative medical risk stratification.     08/17/17, TURBT, OR, BLADDER CANCER, stage TA low grade disease, no therapy.     11/03/17, now 3 months status post his diagnosis of BLADDER CANCER. CYSTOSCOPY today is negative. Cytology is negative. Urine cultures negative. Creatinine 1.24. PSA 0.8. IVP is negative. He will return in 3 months. He will continue on Flomax and Myrbetric.     02/20/18, now 6 months status post his last BLADDER CANCER, stage TA low grade, no therapy. Today CYSTOSCOPY is negative. Cytology is negative. Urinalysis and urine culture negative. PSA is 0.7. He will maintain Flomax and Myrbetric. He will return in 3 months.     05/21/18, now 9 months status post his last BLADDER CANCER, stage TA low grade disease, no therapy. Today's surveillance CYSTOSCOPY is negative for recurrent tumor. Cytology is negative for malignant cells. He will return in 3 months. He will continue on Flomax and Myrbetric at the 25 mg dose.     08/24/18, now 1 year status post his last BLADDER CANCER, stage TA low grade disease, no therapy. Today's SURVEILLANCE CYSTOSCOPY is negative for recurrent tumor. Cytology is negative for malignant cells. He will return in 3 months. He will continue on Flomax and Myrbetric at the 25 mg dose.     11/20/18, now 1 year and 3 months status post his last BLADDER CANCER, stage TA low grade disease, no therapy. Today's surveillance CYSTOSCOPY is negative for recurrent tumor. Flow rate of 6 with  a PVR 37. He has discontinued his Myrbetric. He continues on Flomax daily. Urinalysis, urine culture, urine cytology are all negative. PSA is 1.2. IVP is negative for any filling defects. He will return in 3 months.     03/30/19, now 1 year and 7 months status post his last Bladder Cancer, stage TA low grade disease, no therapy. Today's SURVEILLANCE CYSTOSCOPY is negative for recurrent tumor. He continues on daily Flomax. He will return in 3 months.     07/01/19, now 1 year and 11 months status post his last bladder cancer, stage TA low grade disease, no therapy. Today's surveillance cystoscopy is negative for recurrent tumor. It was uncomfortable at the prostatic urethra and we will transition to 10 minutes of lidocaine. He is also changed from Flomax to daily Uroxatral 10 mg. He will return in 3 months. Flow rate of 8 with a PVR of 12.     August 2019, 66-year-old brother underwent radical prostatectomy for prostate cancer     10/04/19, now 2 years and 2 months status post his last bladder cancer, stage TA low grade disease, no therapy. Today's cystoscopy is negative for recurrent tumor. He continues on daily Uroxatral. Flow rate of 24 with a PVR 65. He will return in 3 months for his 1 year visit with appropriate laboratories visit.     02/05/20, cystoscopy today does not reveal any evidence of recurrent tumor. Flow rate of 5 cc/s with a PVR 16 cc. Now 2 years and 6 months status post his last bladder cancer, stage TA, low-grade disease, no therapy. He continues on daily Uroxatral. Urinalysis, urine culture and urine cytology are negative PSA 0.8. Hematocrit 46%. IVP negative. Creatinine 1.3. The patient's brother, Greg, was diagnosed with Aubrie 7 prostate cancer on 08/14/19 and underwent a radical retropubic prostatectomy. The patient's brother Greg presenting PSA was 2.95. He will now be on a 6 month schedule.     06/29/20, cystoscopy today does not reveal any evidence of recurrent tumor. Flow rate of 5  cc/s with a PVR of 100 cc. He is now 2 years and 10 months status post his last bladder cancer, stage TA, low-grade disease with no therapy. He continues on daily Uroxatral. Creatinine is 1.35 in stable. We will see him again in November.     11/30/20, cystoscopy today does not reveal any evidence of recurrent tumor. Flow rate of 6 cc/s with a PVR of 61 cc. He is now 3 years and 3 months status post his last bladder cancer, stage TA low grade disease with no therapy. He continues on Uroxatral. IVP is negative for mass or stones. Creatinine 1.20. Hematocrit 45%. PSA 1.0. Urinalysis no blood, urine culture no growth, urine cytology is negative for malignant cells. He will return in 6 months     May 24, 2021, cystoscopy today once again does not reveal any evidence of recurrent tumor. Some prostatitis today. Flow rate 21 cc/s, total volume 183 cc, PVR 26 cc. He is on daily Uroxatrol. He is now 3 years and 9 months status post his last bladder cancer, stage TA low-grade disease, with no therapy. He will return in 6 months.     November 17, 2021, cystoscopy today does not reveal any evidence of recurrent tumor. Flow rate 15 cc/s, total volume 110 cc, PVR 35 cc. He continues on daily Uroxatrol. He is now 4 years status post his last bladder cancer, stage TA low-grade disease, no therapy. IVP does not show any suspicious findings of upper tract urothelial cancer. Urine culture no growth. Urinalysis no blood. Urine cytology is lacking atypia. PSA 0.80. Creatinine 1.26. Hematocrit 44.7%. He will return in 1 year.     February 7, 2023, cystoscopy today does not reveal any evidence of recurrent bladder cancer. Flow rate of 13 cc/s, total volume 94 cc, PVR 18 cc. He continues on Uroxatrol with excellent results. He is now 5 years status post his last bladder cancer, stage TA low-grade disease, no therapy. Renal ultrasound does not show any suspicious lesions or hydronephrosis. Urinalysis shows no blood, urine culture no growth,  urine cytology saw 1 area of atypical cells which could be from a neoplastic process. Creatinine 1.10. PSA 1.05. Hematocrit 43.8%. He will return in 1 year.    November 7, 2023, cystoscopy today does not reveal any evidence of recurrent bladder tumor or cancer.  Now 5 years status post his last bladder cancer, stage TA low-grade disease, no therapy.  Patient continues on Uroxatrol with excellent results.  Flow rate of 16 cc/s with a PVR of 10 cc.  Creatinine 1.16.  Hematocrit 46.3%.  PSA 0.82.  Urine culture no growth.  Urine cytology is atypical cannot rule out neoplasm.  Renal ultrasound is unremarkable.  He will return in 1 year.        PLAN:     #1 patient will continue Uroxatral 10 mg by mouth daily      #2 patient will return for cystoscopy, flow rate and PVR, urine studies, blood studies, PSA, renal ultrasound in November 2024.  2 separate LIDOCAINE gel 10 minutes PRIOR.    Physical Exam  Vitals and nursing note reviewed. Exam conducted with a chaperone present.   Constitutional:       Appearance: Normal appearance.   HENT:      Head: Normocephalic and atraumatic.   Pulmonary:      Effort: Pulmonary effort is normal.   Abdominal:      Palpations: Abdomen is soft.      Tenderness: There is no abdominal tenderness.   Genitourinary:     Penis: Normal.       Testes: Normal.   Musculoskeletal:         General: Normal range of motion.      Cervical back: Normal range of motion and neck supple.   Neurological:      General: No focal deficit present.      Mental Status: He is alert and oriented to person, place, and time.   Psychiatric:         Mood and Affect: Mood normal.         Behavior: Behavior normal.         This note was created with voice-recognition software and was not corrected for typographical or grammatical errors.

## 2023-11-20 DIAGNOSIS — K44.9 HIATAL HERNIA: Primary | ICD-10-CM

## 2023-11-20 DIAGNOSIS — M54.50 LOW BACK PAIN, UNSPECIFIED BACK PAIN LATERALITY, UNSPECIFIED CHRONICITY, UNSPECIFIED WHETHER SCIATICA PRESENT: ICD-10-CM

## 2023-11-20 RX ORDER — OMEPRAZOLE 20 MG/1
20 CAPSULE, DELAYED RELEASE ORAL
Qty: 90 CAPSULE | Refills: 1 | Status: SHIPPED | OUTPATIENT
Start: 2023-11-20 | End: 2024-05-22 | Stop reason: SDUPTHER

## 2023-11-20 RX ORDER — MELOXICAM 15 MG/1
15 TABLET ORAL DAILY
Qty: 30 TABLET | Refills: 0 | Status: SHIPPED | OUTPATIENT
Start: 2023-11-20 | End: 2023-12-20

## 2023-11-20 NOTE — TELEPHONE ENCOUNTER
Rx Refill Request Telephone Encounter    Name:  Florentino Mccall  :  [id number]  Medication Name:  Meloxicam, Omeprazole refill request

## 2023-12-20 DIAGNOSIS — M54.50 LOW BACK PAIN, UNSPECIFIED BACK PAIN LATERALITY, UNSPECIFIED CHRONICITY, UNSPECIFIED WHETHER SCIATICA PRESENT: ICD-10-CM

## 2023-12-20 RX ORDER — MELOXICAM 15 MG/1
15 TABLET ORAL DAILY
Qty: 30 TABLET | Refills: 0 | Status: SHIPPED | OUTPATIENT
Start: 2023-12-20 | End: 2024-01-05

## 2024-01-05 DIAGNOSIS — M54.50 LOW BACK PAIN, UNSPECIFIED BACK PAIN LATERALITY, UNSPECIFIED CHRONICITY, UNSPECIFIED WHETHER SCIATICA PRESENT: ICD-10-CM

## 2024-01-05 RX ORDER — MELOXICAM 15 MG/1
15 TABLET ORAL DAILY
Qty: 30 TABLET | Refills: 3 | Status: SHIPPED | OUTPATIENT
Start: 2024-01-05 | End: 2024-05-22

## 2024-01-15 ENCOUNTER — OFFICE VISIT (OUTPATIENT)
Dept: PRIMARY CARE | Facility: CLINIC | Age: 69
End: 2024-01-15
Payer: MEDICARE

## 2024-01-15 VITALS
OXYGEN SATURATION: 95 % | HEART RATE: 64 BPM | BODY MASS INDEX: 34.58 KG/M2 | DIASTOLIC BLOOD PRESSURE: 88 MMHG | WEIGHT: 247 LBS | HEIGHT: 71 IN | SYSTOLIC BLOOD PRESSURE: 127 MMHG

## 2024-01-15 DIAGNOSIS — Z00.00 ROUTINE GENERAL MEDICAL EXAMINATION AT HEALTH CARE FACILITY: Primary | ICD-10-CM

## 2024-01-15 DIAGNOSIS — E78.5 HYPERLIPIDEMIA, UNSPECIFIED HYPERLIPIDEMIA TYPE: ICD-10-CM

## 2024-01-15 DIAGNOSIS — I10 ESSENTIAL HYPERTENSION: ICD-10-CM

## 2024-01-15 DIAGNOSIS — I77.810 AORTIC ROOT DILATATION (CMS-HCC): ICD-10-CM

## 2024-01-15 DIAGNOSIS — C67.9 MALIGNANT NEOPLASM OF URINARY BLADDER, UNSPECIFIED SITE (MULTI): ICD-10-CM

## 2024-01-15 PROCEDURE — 99214 OFFICE O/P EST MOD 30 MIN: CPT | Performed by: FAMILY MEDICINE

## 2024-01-15 PROCEDURE — 3074F SYST BP LT 130 MM HG: CPT | Performed by: FAMILY MEDICINE

## 2024-01-15 PROCEDURE — 1170F FXNL STATUS ASSESSED: CPT | Performed by: FAMILY MEDICINE

## 2024-01-15 PROCEDURE — 3079F DIAST BP 80-89 MM HG: CPT | Performed by: FAMILY MEDICINE

## 2024-01-15 PROCEDURE — 1159F MED LIST DOCD IN RCRD: CPT | Performed by: FAMILY MEDICINE

## 2024-01-15 PROCEDURE — G0439 PPPS, SUBSEQ VISIT: HCPCS | Performed by: FAMILY MEDICINE

## 2024-01-15 PROCEDURE — G0008 ADMIN INFLUENZA VIRUS VAC: HCPCS | Performed by: FAMILY MEDICINE

## 2024-01-15 PROCEDURE — 1126F AMNT PAIN NOTED NONE PRSNT: CPT | Performed by: FAMILY MEDICINE

## 2024-01-15 PROCEDURE — 1036F TOBACCO NON-USER: CPT | Performed by: FAMILY MEDICINE

## 2024-01-15 PROCEDURE — 90662 IIV NO PRSV INCREASED AG IM: CPT | Performed by: FAMILY MEDICINE

## 2024-01-15 ASSESSMENT — ACTIVITIES OF DAILY LIVING (ADL)
DRESSING: INDEPENDENT
BATHING: INDEPENDENT
GROCERY_SHOPPING: INDEPENDENT
TAKING_MEDICATION: INDEPENDENT
DOING_HOUSEWORK: INDEPENDENT
MANAGING_FINANCES: INDEPENDENT

## 2024-01-15 ASSESSMENT — ENCOUNTER SYMPTOMS
HEADACHES: 0
SHORTNESS OF BREATH: 0
HYPERTENSION: 1
COUGH: 0
FEVER: 0
DIZZINESS: 1
BACK PAIN: 1

## 2024-01-15 ASSESSMENT — PATIENT HEALTH QUESTIONNAIRE - PHQ9
2. FEELING DOWN, DEPRESSED OR HOPELESS: NOT AT ALL
1. LITTLE INTEREST OR PLEASURE IN DOING THINGS: NOT AT ALL
SUM OF ALL RESPONSES TO PHQ9 QUESTIONS 1 AND 2: 0

## 2024-01-15 NOTE — PROGRESS NOTES
Subjective   Patient ID: Florentino Mccall is a 68 y.o. male who presents for Annual Exam (Pt has active Advanced Directives ) and Hypertension (Since testing positive for covid about a month ago patient has been having high blood pressure with some dizziness. In addition, has been noticing low oxygen. ).    Hypertension  This is a chronic problem. The current episode started more than 1 year ago. Pertinent negatives include no chest pain, headaches or shortness of breath.   Hyperlipidemia  This is a chronic problem. Pertinent negatives include no chest pain or shortness of breath.      He is here today for annual Medicare physical and also follow-up  He has a history of hypertension currently taking lisinopril 20 mg twice daily.  He also has a prescription for amlodipine 5 mg 2 times per day which he takes as needed when blood pressure is elevated.  He mentions that he did have COVID around Marlon time and was having some higher blood pressure readings after that.  Respiratory symptoms have resolved.  He has had some dizziness since having COVID but reports this has been improving also  He has a history of hyperlipidemia currently taking ezetimibe 10 mg daily and also pravastatin 10 mg nightly.  He previously could not tolerate atorvastatin or rosuvastatin due to muscle cramping  Last lipid panel done 8/9/2023 showed LDL of 105 and total cholesterol 176  He has been taking meloxicam as needed for chronic low back pain which has been helping with his back pain  He denies any chest pain, shortness of breath  He follows with urology due to history of bladder cancer which was diagnosed in 2017 and treated with surgery.  They are monitoring PSA levels on him  Last colonoscopy was 2022.  Repeat 5 years recommended  Vaccinations: Received Tdap vaccine in 2023.  Has received the Shingrix vaccine series previously.  Received Prevnar 20 in 2023.  Has not yet received a influenza vaccine this year  No falls in the past year.  He  "does have a healthcare power of               Review of Systems   Constitutional:  Negative for fever.   Respiratory:  Negative for cough and shortness of breath.    Cardiovascular:  Negative for chest pain and leg swelling.   Musculoskeletal:  Positive for back pain.   Skin:  Negative for rash.   Neurological:  Positive for dizziness. Negative for headaches.   All other systems reviewed and are negative.      Objective   /88   Pulse 64   Ht 1.803 m (5' 11\")   Wt 112 kg (247 lb)   SpO2 95%   BMI 34.45 kg/m²     Physical Exam  Vitals reviewed.   Constitutional:       General: He is not in acute distress.     Appearance: Normal appearance. He is well-developed.   HENT:      Head: Normocephalic.      Right Ear: Tympanic membrane, ear canal and external ear normal.      Left Ear: Tympanic membrane, ear canal and external ear normal.      Nose: Nose normal.      Mouth/Throat:      Mouth: Mucous membranes are moist.   Eyes:      Conjunctiva/sclera: Conjunctivae normal.   Neck:      Thyroid: No thyromegaly.      Vascular: No JVD.   Cardiovascular:      Rate and Rhythm: Normal rate and regular rhythm.      Heart sounds: Normal heart sounds.   Pulmonary:      Effort: Pulmonary effort is normal.      Breath sounds: Normal breath sounds.   Abdominal:      Palpations: Abdomen is soft.      Tenderness: There is no abdominal tenderness.   Musculoskeletal:         General: Normal range of motion.      Right lower leg: No edema.      Left lower leg: No edema.   Lymphadenopathy:      Cervical: No cervical adenopathy.   Skin:     Findings: No rash.   Neurological:      Mental Status: He is alert and oriented to person, place, and time.   Psychiatric:         Mood and Affect: Mood normal.         Behavior: Behavior normal.         Assessment/Plan   Problem List Items Addressed This Visit          Medium    Aortic root dilatation (CMS/HCC)     Stable based on symptoms and exam.  Continue established treatment plan " and follow-up at least yearly  He is scheduled to establish with a new cardiologist next month         Essential hypertension    Hyperlipidemia    Malignant tumor of urinary bladder (CMS/HCC)     Stable based on symptoms and exam.  Continue established treatment plan and follow-up at least yearly  Continue to follow with urology          Other Visit Diagnoses       Routine general medical examination at health care facility    -  Primary        #1 preventative health:  Healthy diet and regular exercise recommended  Up-to-date on colon cancer screening  PSA is being monitored by urology  Up-to-date on tetanus, pneumococcal and Shingrix vaccines.  Influenza vaccine given today.  Annual COVID-vaccine recommended  2.  Hypertension: This is stable and blood pressure today is at goal at 127/88.  Continue lisinopril 20 mg twice daily and amlodipine 5 mg twice daily as needed  3.  Hyperlipidemia: This is stable and last LDL was 105 and total cholesterol 176.  Continue ezetimibe and pravastatin at current dose  Labs including PSA, CBC and CMP have been ordered by urology

## 2024-01-15 NOTE — ASSESSMENT & PLAN NOTE
Stable based on symptoms and exam.  Continue established treatment plan and follow-up at least yearly  He is scheduled to establish with a new cardiologist next month

## 2024-01-15 NOTE — ASSESSMENT & PLAN NOTE
Stable based on symptoms and exam.  Continue established treatment plan and follow-up at least yearly  Continue to follow with urology

## 2024-02-07 ENCOUNTER — HOSPITAL ENCOUNTER (OUTPATIENT)
Dept: CARDIOLOGY | Facility: HOSPITAL | Age: 69
Discharge: HOME | End: 2024-02-07
Payer: MEDICARE

## 2024-02-07 ENCOUNTER — OFFICE VISIT (OUTPATIENT)
Dept: CARDIOLOGY | Facility: CLINIC | Age: 69
End: 2024-02-07
Payer: MEDICARE

## 2024-02-07 VITALS
SYSTOLIC BLOOD PRESSURE: 126 MMHG | WEIGHT: 247 LBS | DIASTOLIC BLOOD PRESSURE: 80 MMHG | HEIGHT: 71 IN | BODY MASS INDEX: 34.58 KG/M2 | HEART RATE: 67 BPM

## 2024-02-07 DIAGNOSIS — R55 SYNCOPE AND COLLAPSE: ICD-10-CM

## 2024-02-07 DIAGNOSIS — Z95.818 PRESENCE OF OTHER CARDIAC IMPLANTS AND GRAFTS: ICD-10-CM

## 2024-02-07 DIAGNOSIS — R42 DIZZINESS: ICD-10-CM

## 2024-02-07 DIAGNOSIS — Z95.818 STATUS POST PLACEMENT OF IMPLANTABLE LOOP RECORDER: Primary | ICD-10-CM

## 2024-02-07 DIAGNOSIS — Z87.891 FORMER SMOKER: ICD-10-CM

## 2024-02-07 DIAGNOSIS — R55 SYNCOPE, UNSPECIFIED SYNCOPE TYPE: ICD-10-CM

## 2024-02-07 PROBLEM — Z86.61 HISTORY OF MENINGITIS: Status: ACTIVE | Noted: 2024-02-07

## 2024-02-07 PROCEDURE — 1159F MED LIST DOCD IN RCRD: CPT | Performed by: INTERNAL MEDICINE

## 2024-02-07 PROCEDURE — 93291 INTERROG DEV EVAL SCRMS IP: CPT | Performed by: INTERNAL MEDICINE

## 2024-02-07 PROCEDURE — 93000 ELECTROCARDIOGRAM COMPLETE: CPT | Mod: DISTINCT PROCEDURAL SERVICE | Performed by: INTERNAL MEDICINE

## 2024-02-07 PROCEDURE — 1160F RVW MEDS BY RX/DR IN RCRD: CPT | Performed by: INTERNAL MEDICINE

## 2024-02-07 PROCEDURE — 99215 OFFICE O/P EST HI 40 MIN: CPT | Performed by: INTERNAL MEDICINE

## 2024-02-07 PROCEDURE — 3079F DIAST BP 80-89 MM HG: CPT | Performed by: INTERNAL MEDICINE

## 2024-02-07 PROCEDURE — 1036F TOBACCO NON-USER: CPT | Performed by: INTERNAL MEDICINE

## 2024-02-07 PROCEDURE — 93291 INTERROG DEV EVAL SCRMS IP: CPT

## 2024-02-07 PROCEDURE — 3074F SYST BP LT 130 MM HG: CPT | Performed by: INTERNAL MEDICINE

## 2024-02-07 PROCEDURE — 1157F ADVNC CARE PLAN IN RCRD: CPT | Performed by: INTERNAL MEDICINE

## 2024-02-07 PROCEDURE — 1126F AMNT PAIN NOTED NONE PRSNT: CPT | Performed by: INTERNAL MEDICINE

## 2024-02-07 PROCEDURE — 3008F BODY MASS INDEX DOCD: CPT | Performed by: INTERNAL MEDICINE

## 2024-02-07 RX ORDER — LISINOPRIL 40 MG/1
40 TABLET ORAL DAILY
COMMUNITY
End: 2024-02-27 | Stop reason: SDUPTHER

## 2024-02-07 ASSESSMENT — ENCOUNTER SYMPTOMS
SYNCOPE: 1
DIZZINESS: 1
DYSPNEA ON EXERTION: 0
PALPITATIONS: 0

## 2024-02-07 NOTE — PATIENT INSTRUCTIONS
Continue same medications/treatment.  Patient educated on proper medication use.  Patient educated on risk factor modification.  Please bring any lab results from other providers/physicians to your next appointment.    Please bring all medicines, vitamins, and herbal supplements with you when you come to the office.    Prescriptions will not be filled unless you are compliant with your follow up appointments or have a follow up appointment scheduled as per instruction of your physician. Refills should be requested at the time of your visit.    SCHEDULE Echocardiogram  Follow up with Neela in 6 months with device check  Continue monthly remote checks     DEBI RIVAS RN, AM SCRIBING FOR, AND IN THE PRESENCE OF DR. BRIANNA QUIÑONES MD

## 2024-02-07 NOTE — PROGRESS NOTES
CARDIOLOGY OFFICE VISIT      CHIEF COMPLAINT  Chief Complaint   Patient presents with    Establish Care     Patient is here in office today to establish care, he did have a loop recorder place 4/2022 by Dr. Velásquez       HISTORY OF PRESENT ILLNESS  HPI    68-year-old male with a past medical history of hypertension.  Back in April 2022, he had an episode of syncope episode while he was in a restaurant.  He was brought to emergency department. He is cardiac enzymes were negative.  Cardiology was consulted for recommendation for an echocardiogram that shows left ventricular ejection fraction of 60 to 65% with mild aortic regurgitation.  Evidence of diastolic dysfunction.  CT endograft of the heart revealed mild plaque formation in the area of moderate plaque formation in the left anterior descending artery.  Carotid ultrasound was negative.  Orthostatic vital signs were negative.  Chest CTA showed no pulm embolism or aortic dissection.  A loop recorder was implanted by Dr. Velásquez.  Since then patient states that he has been doing well.  Denies any symptoms of chest pain or shortness of breath but he has been noticing his blood pressure sometimes in the upper limits.  He brought his blood pressure readings and most of the times blood pressure systolic is above 140 mmHg.    Patient had last echocardiogram as described above in 2022 that shows left intervention fraction 60% with moderate concentric left ventricular hypertrophy and moderate aortic valve regurgitation.    EKG performed during this admission show sinus rhythm left anterior fascicular block at a rate of 70 bpm QRS duration 80 ms QT corrected 460 ms.  Rhythm strip shows the same pattern.    Patient had a device interrogation performed today at the device clinic and shows no evidence of tachy or bradycardia arrhythmias seen during this study.    Past Medical History  Past Medical History:   Diagnosis Date    Personal history of other diseases of the  musculoskeletal system and connective tissue     History of osteopenia    Personal history of other diseases of the musculoskeletal system and connective tissue     History of osteoarthritis       Social History  Social History     Tobacco Use    Smoking status: Former     Packs/day: 1.00     Years: 45.00     Additional pack years: 0.00     Total pack years: 45.00     Types: Cigarettes     Quit date:      Years since quittin.1    Smokeless tobacco: Never   Substance Use Topics    Alcohol use: Not Currently    Drug use: Not Currently       Family History     Family History   Problem Relation Name Age of Onset    Other (cardiac arrhythmia) Mother      Coronary artery disease Mother      Other (aortic root) Daughter      Other (enlarged aorta) Daughter          Allergies:  Allergies   Allergen Reactions    Sulindac Anaphylaxis     Takes meloxicam without problem        Outpatient Medications:  Current Outpatient Medications   Medication Instructions    albuterol 90 mcg/actuation inhaler 2 puffs, inhalation, Every 4 hours PRN    amLODIPine (NORVASC) 5 mg, oral, 2 times daily    aspirin 81 mg EC tablet oral    ezetimibe (ZETIA) 10 mg, oral, Daily    lisinopril 40 mg, oral, Daily    meloxicam (MOBIC) 15 mg, oral, Daily    omeprazole (PRILOSEC) 20 mg, oral, Daily before breakfast    pravastatin (Pravachol) 10 mg tablet 1 tablet, oral, Nightly    sildenafil (Viagra) 100 mg tablet 1 tablet, oral    sodium chloride 5 % ophthalmic ointment daily   at bedtime   os          REVIEW OF SYSTEMS  Review of Systems   Cardiovascular:  Positive for syncope. Negative for chest pain, dyspnea on exertion and palpitations.   Neurological:  Positive for dizziness.   All other systems reviewed and are negative.        VITALS  Vitals:    24 1207   BP: 126/80   Pulse: 67       PHYSICAL EXAM  Constitutional:       General: Awake.      Appearance: Normal and healthy appearance. Well-developed and not in distress.   Neck:       Vascular: No JVR. JVD normal.   Pulmonary:      Effort: Pulmonary effort is normal.      Breath sounds: Normal breath sounds. No wheezing. No rhonchi. No rales.   Chest:      Chest wall: Not tender to palpatation.      Comments: Loop recorder in place     Cardiovascular:      PMI at left midclavicular line. Normal rate. Regular rhythm. Normal S1. Normal S2.       Murmurs: There is no murmur.      No gallop.  No click. No rub.   Pulses:     Intact distal pulses.   Edema:     Peripheral edema absent.   Abdominal:      Tenderness: There is no abdominal tenderness.   Musculoskeletal: Normal range of motion.         General: No tenderness. Skin:     General: Skin is warm and dry.   Neurological:      General: No focal deficit present.      Mental Status: Alert and oriented to person, place and time.           ASSESSMENT AND PLAN  1.  Syncope of unknown etiology  2.  Status post loop recorder implantation  3.  Mild coronary artery disease per CTA angiogram as described above  4.  Normal left ventricular function per echogram in 2022  5.  Hypertension    Plan-recommendations    So far patient is doing well from the electrophysiology standpoint.  Loop recorder has been reviewed with no significant events noted.  No tachy or bradycardia arrhythmias.  Will continue with observation for now.    Patient should be followed my office every 6 months or sooner needed.    Follow device clinic as scheduled.    Risk factor modification and lifestyle modification discussed with patient. Diet , exercise and hydration discussed with patient.    Patient was instructed to use amlodipine 10 mg daily.  He should have a blood pressure check frequently.  We may have to readjust medication therapy depends on values of blood pressure.    I have personally review with patient during this office visit, laboratory data, echocardiogram results, stress test results, Holter-event monitor results prior and after the last electrophysiology visit. All  questions has been answered.    Please excuse any errors in grammar or translation related to this dictation.  Voice recognition software was utilized to prepare this document.

## 2024-02-22 DIAGNOSIS — E78.5 HYPERLIPIDEMIA, UNSPECIFIED HYPERLIPIDEMIA TYPE: ICD-10-CM

## 2024-02-22 RX ORDER — PRAVASTATIN SODIUM 10 MG/1
10 TABLET ORAL NIGHTLY
Qty: 90 TABLET | Refills: 3 | Status: SHIPPED | OUTPATIENT
Start: 2024-02-22

## 2024-02-27 ENCOUNTER — HOSPITAL ENCOUNTER (OUTPATIENT)
Dept: CARDIOLOGY | Facility: CLINIC | Age: 69
Discharge: HOME | End: 2024-02-27
Payer: MEDICARE

## 2024-02-27 DIAGNOSIS — I10 ESSENTIAL HYPERTENSION: Primary | ICD-10-CM

## 2024-02-27 DIAGNOSIS — R55 SYNCOPE, UNSPECIFIED SYNCOPE TYPE: ICD-10-CM

## 2024-02-27 DIAGNOSIS — R42 DIZZINESS: ICD-10-CM

## 2024-02-27 LAB
AORTIC VALVE MEAN GRADIENT: 5 MMHG
AORTIC VALVE PEAK VELOCITY: 1.53 M/S
AV PEAK GRADIENT: 9.4 MMHG
AVA (PEAK VEL): 2.96 CM2
AVA (VTI): 2.88 CM2
EJECTION FRACTION APICAL 4 CHAMBER: 69.2
LEFT VENTRICLE INTERNAL DIMENSION DIASTOLE: 4.5 CM (ref 3.5–6)
LEFT VENTRICULAR OUTFLOW TRACT DIAMETER: 2.3 CM
MITRAL VALVE E/A RATIO: 0.91
MITRAL VALVE E/E' RATIO: 4.4
RIGHT VENTRICLE PEAK SYSTOLIC PRESSURE: 35.7 MMHG

## 2024-02-27 PROCEDURE — 93306 TTE W/DOPPLER COMPLETE: CPT | Performed by: INTERNAL MEDICINE

## 2024-02-27 PROCEDURE — 93306 TTE W/DOPPLER COMPLETE: CPT

## 2024-02-27 RX ORDER — LISINOPRIL 40 MG/1
40 TABLET ORAL DAILY
Qty: 90 TABLET | Refills: 2 | Status: SHIPPED | OUTPATIENT
Start: 2024-02-27 | End: 2024-05-22 | Stop reason: SDUPTHER

## 2024-03-15 ENCOUNTER — HOSPITAL ENCOUNTER (OUTPATIENT)
Dept: CARDIOLOGY | Facility: HOSPITAL | Age: 69
Discharge: HOME | End: 2024-03-15
Payer: MEDICARE

## 2024-03-15 DIAGNOSIS — Z95.818 STATUS POST PLACEMENT OF IMPLANTABLE LOOP RECORDER: ICD-10-CM

## 2024-03-15 PROCEDURE — 93298 REM INTERROG DEV EVAL SCRMS: CPT | Performed by: INTERNAL MEDICINE

## 2024-03-15 PROCEDURE — 93298 REM INTERROG DEV EVAL SCRMS: CPT

## 2024-03-26 DIAGNOSIS — N41.9 PROSTATITIS, UNSPECIFIED PROSTATITIS TYPE: ICD-10-CM

## 2024-03-26 RX ORDER — CIPROFLOXACIN 500 MG/1
500 TABLET ORAL 2 TIMES DAILY
Qty: 14 TABLET | Refills: 0 | Status: SHIPPED | OUTPATIENT
Start: 2024-03-26 | End: 2024-04-02

## 2024-04-10 ENCOUNTER — OFFICE VISIT (OUTPATIENT)
Dept: PRIMARY CARE | Facility: CLINIC | Age: 69
End: 2024-04-10
Payer: MEDICARE

## 2024-04-10 VITALS
HEART RATE: 69 BPM | WEIGHT: 246 LBS | SYSTOLIC BLOOD PRESSURE: 145 MMHG | OXYGEN SATURATION: 95 % | DIASTOLIC BLOOD PRESSURE: 82 MMHG | BODY MASS INDEX: 34.31 KG/M2

## 2024-04-10 DIAGNOSIS — I10 ESSENTIAL HYPERTENSION: Primary | ICD-10-CM

## 2024-04-10 DIAGNOSIS — M47.816 OSTEOARTHRITIS OF LUMBAR SPINE, UNSPECIFIED SPINAL OSTEOARTHRITIS COMPLICATION STATUS: ICD-10-CM

## 2024-04-10 DIAGNOSIS — R60.0 BILATERAL LEG EDEMA: ICD-10-CM

## 2024-04-10 PROBLEM — G03.9 MENINGITIS (HHS-HCC): Status: RESOLVED | Noted: 2023-07-28 | Resolved: 2024-04-10

## 2024-04-10 PROBLEM — Z87.891 FORMER SMOKER: Status: RESOLVED | Noted: 2024-02-07 | Resolved: 2024-04-10

## 2024-04-10 PROBLEM — R07.9 CHEST PAIN: Status: RESOLVED | Noted: 2023-11-05 | Resolved: 2024-04-10

## 2024-04-10 PROBLEM — M54.50 LOW BACK PAIN: Status: RESOLVED | Noted: 2023-05-10 | Resolved: 2024-04-10

## 2024-04-10 PROBLEM — T78.40XA ALLERGIC DRUG REACTION: Status: RESOLVED | Noted: 2023-11-05 | Resolved: 2024-04-10

## 2024-04-10 PROCEDURE — 3008F BODY MASS INDEX DOCD: CPT | Performed by: FAMILY MEDICINE

## 2024-04-10 PROCEDURE — 1036F TOBACCO NON-USER: CPT | Performed by: FAMILY MEDICINE

## 2024-04-10 PROCEDURE — 3079F DIAST BP 80-89 MM HG: CPT | Performed by: FAMILY MEDICINE

## 2024-04-10 PROCEDURE — 3077F SYST BP >= 140 MM HG: CPT | Performed by: FAMILY MEDICINE

## 2024-04-10 PROCEDURE — 99214 OFFICE O/P EST MOD 30 MIN: CPT | Performed by: FAMILY MEDICINE

## 2024-04-10 PROCEDURE — 1160F RVW MEDS BY RX/DR IN RCRD: CPT | Performed by: FAMILY MEDICINE

## 2024-04-10 PROCEDURE — 1159F MED LIST DOCD IN RCRD: CPT | Performed by: FAMILY MEDICINE

## 2024-04-10 PROCEDURE — 1157F ADVNC CARE PLAN IN RCRD: CPT | Performed by: FAMILY MEDICINE

## 2024-04-10 RX ORDER — HYDROCHLOROTHIAZIDE 12.5 MG/1
12.5 TABLET ORAL DAILY
Qty: 30 TABLET | Refills: 3 | Status: SHIPPED | OUTPATIENT
Start: 2024-04-10

## 2024-04-10 RX ORDER — PREDNISONE 20 MG/1
TABLET ORAL DAILY
Qty: 18 TABLET | Refills: 0 | Status: SHIPPED | OUTPATIENT
Start: 2024-04-10 | End: 2024-04-18

## 2024-04-10 ASSESSMENT — ENCOUNTER SYMPTOMS
BACK PAIN: 1
COUGH: 0
FEVER: 0
SHORTNESS OF BREATH: 0

## 2024-04-10 NOTE — PROGRESS NOTES
Subjective   Patient ID: Florentino Mccall is a 68 y.o. male who presents for Leg Swelling (Leg, feet, and ankle edema x 2 weeks ago. Right leg discoloration. ) and Back Pain.    Back Pain  This is a recurrent problem. The pain is at a severity of 8/10. Pertinent negatives include no chest pain or fever.      He is here today to discuss leg edema and back pain  He developed leg edema approximately 2 weeks ago.  [place] recently was [place] and did a lot of walking around, and leg edema had started at that time  Swelling involves both sides equally.  He has been resting and keeping his legs elevated, and tells me that over the past 2 days his leg swelling has improved significantly.  He reports that his swelling is approximately 70 to 80% better.  No chest pain or shortness of breath  He has a history of hypertension currently taking amlodipine 5 mg twice daily and lisinopril 20 mg twice daily.  He has been checking his blood pressure out of the office and most readings have been in the 140s  He has a history of chronic low back pain and currently follows with pain management, but cannot get into see them for another 3 weeks.  Over the past 2 weeks he has been getting increased pain in his lower back.  This involves both sides and is a dull pain up to 8 out of 10 intensity.  No radiation to legs.  No lower extremity numbness or tingling.  Steroids have worked well for flareups in the past        Review of Systems   Constitutional:  Negative for fever.   Respiratory:  Negative for cough and shortness of breath.    Cardiovascular:  Positive for leg swelling. Negative for chest pain.   Musculoskeletal:  Positive for back pain.       Objective   /82   Pulse 69   Wt 112 kg (246 lb)   SpO2 95%   BMI 34.31 kg/m²     Physical Exam  Vitals reviewed.   Constitutional:       General: He is not in acute distress.     Appearance: Normal appearance. He is well-developed.   HENT:      Head: Normocephalic.   Eyes:       Conjunctiva/sclera: Conjunctivae normal.   Cardiovascular:      Rate and Rhythm: Normal rate and regular rhythm.      Heart sounds: Normal heart sounds.   Pulmonary:      Effort: Pulmonary effort is normal.      Breath sounds: Normal breath sounds.   Musculoskeletal:         General: No tenderness.      Right lower leg: Edema present.      Left lower leg: Edema present.      Comments: Trace to 1+ bilateral leg edema.  No erythema or warmth.  No calf tenderness  Points to bilateral lower lumbar paraspinals as the area of his pain.  Lower extremity strength is 5 out of 5 bilaterally.  Patellar reflexes intact   Skin:     Findings: No rash.   Neurological:      Mental Status: He is alert.   Psychiatric:         Mood and Affect: Mood normal.         Behavior: Behavior normal.         Assessment/Plan   Problem List Items Addressed This Visit          Medium    Degenerative arthritis of lumbar spine    Relevant Medications    predniSONE (Deltasone) 20 mg tablet    Essential hypertension - Primary    Relevant Medications    hydroCHLOROthiazide (Microzide) 12.5 mg tablet   #1 leg edema: He reports that this has improved significantly over the past few days, and denies any other accompanying symptoms including chest pain or shortness of breath.  Of note he did recently have an echocardiogram done in February of this year which showed LVEF of 60%  I suspect that this is due to a combination of frequent walking with his recent trip, and also amlodipine.  Since this has been improving I recommended continuing current care including keeping legs elevated and resting.  His blood pressure today is elevated at 145/82 and he has been getting elevated BP readings out of the office also.  We will add HCTZ 12.5 mg daily which should help with both his leg swelling and also his blood pressure.  Adverse effects discussed.  Recommended follow-up in 1 month to recheck on blood pressure.  Continue other medications including lisinopril and  amlodipine  Chronic low back pain: This has been worsening over the past few weeks.  We will treat with a 9-day prednisone taper and he will continue to follow with pain management.  Recheck at follow-up in 1 month

## 2024-04-19 ENCOUNTER — HOSPITAL ENCOUNTER (OUTPATIENT)
Dept: CARDIOLOGY | Facility: HOSPITAL | Age: 69
Discharge: HOME | End: 2024-04-19
Payer: MEDICARE

## 2024-04-19 DIAGNOSIS — Z95.818 STATUS POST PLACEMENT OF IMPLANTABLE LOOP RECORDER: ICD-10-CM

## 2024-04-19 PROCEDURE — 93298 REM INTERROG DEV EVAL SCRMS: CPT | Performed by: INTERNAL MEDICINE

## 2024-04-19 PROCEDURE — 93298 REM INTERROG DEV EVAL SCRMS: CPT

## 2024-05-15 ENCOUNTER — APPOINTMENT (OUTPATIENT)
Dept: PRIMARY CARE | Facility: CLINIC | Age: 69
End: 2024-05-15
Payer: MEDICARE

## 2024-05-22 ENCOUNTER — TELEPHONE (OUTPATIENT)
Dept: PRIMARY CARE | Facility: CLINIC | Age: 69
End: 2024-05-22
Payer: MEDICARE

## 2024-05-22 DIAGNOSIS — M54.50 LOW BACK PAIN, UNSPECIFIED BACK PAIN LATERALITY, UNSPECIFIED CHRONICITY, UNSPECIFIED WHETHER SCIATICA PRESENT: ICD-10-CM

## 2024-05-22 DIAGNOSIS — I10 ESSENTIAL HYPERTENSION: ICD-10-CM

## 2024-05-22 DIAGNOSIS — K44.9 HIATAL HERNIA: ICD-10-CM

## 2024-05-22 RX ORDER — LISINOPRIL 40 MG/1
40 TABLET ORAL DAILY
Qty: 90 TABLET | Refills: 2 | Status: SHIPPED | OUTPATIENT
Start: 2024-05-22

## 2024-05-22 RX ORDER — MELOXICAM 15 MG/1
15 TABLET ORAL DAILY
Qty: 30 TABLET | Refills: 2 | Status: SHIPPED | OUTPATIENT
Start: 2024-05-22

## 2024-05-22 RX ORDER — AMLODIPINE BESYLATE 5 MG/1
5 TABLET ORAL 2 TIMES DAILY
Qty: 180 TABLET | Refills: 2 | Status: SHIPPED | OUTPATIENT
Start: 2024-05-22

## 2024-05-22 RX ORDER — OMEPRAZOLE 20 MG/1
20 CAPSULE, DELAYED RELEASE ORAL
Qty: 90 CAPSULE | Refills: 2 | Status: SHIPPED | OUTPATIENT
Start: 2024-05-22

## 2024-05-22 NOTE — TELEPHONE ENCOUNTER
Rx Refill Request Telephone Encounter    Name:  Florentino Mccall  :  [id number]  Medication Name:    amLODIPine (Norvasc) 5 mg tablet    lisinopril  20 mg twice a day  meloxicam (Mobic) 15 mg tablet   omeprazole (PriLOSEC) 20 mg DR capsule               Specific Pharmacy location:  [facility]  Date of last appointment:  na  Date of next appointment:  na  Best number to reach patient:  na        Please note the dosage change  90 Day Supply

## 2024-05-24 ENCOUNTER — HOSPITAL ENCOUNTER (OUTPATIENT)
Dept: CARDIOLOGY | Facility: HOSPITAL | Age: 69
Discharge: HOME | End: 2024-05-24
Payer: MEDICARE

## 2024-05-24 DIAGNOSIS — Z95.818 STATUS POST PLACEMENT OF IMPLANTABLE LOOP RECORDER: ICD-10-CM

## 2024-05-24 PROCEDURE — 93298 REM INTERROG DEV EVAL SCRMS: CPT

## 2024-05-24 PROCEDURE — 93298 REM INTERROG DEV EVAL SCRMS: CPT | Performed by: INTERNAL MEDICINE

## 2024-06-28 ENCOUNTER — HOSPITAL ENCOUNTER (OUTPATIENT)
Dept: CARDIOLOGY | Facility: HOSPITAL | Age: 69
Discharge: HOME | End: 2024-06-28
Payer: MEDICARE

## 2024-06-28 DIAGNOSIS — Z95.818 STATUS POST PLACEMENT OF IMPLANTABLE LOOP RECORDER: ICD-10-CM

## 2024-06-28 PROCEDURE — 93298 REM INTERROG DEV EVAL SCRMS: CPT

## 2024-06-28 PROCEDURE — 93298 REM INTERROG DEV EVAL SCRMS: CPT | Performed by: INTERNAL MEDICINE

## 2024-07-15 ENCOUNTER — APPOINTMENT (OUTPATIENT)
Dept: PRIMARY CARE | Facility: CLINIC | Age: 69
End: 2024-07-15
Payer: MEDICARE

## 2024-07-15 ENCOUNTER — LAB (OUTPATIENT)
Dept: LAB | Facility: LAB | Age: 69
End: 2024-07-15
Payer: MEDICARE

## 2024-07-15 VITALS
SYSTOLIC BLOOD PRESSURE: 130 MMHG | DIASTOLIC BLOOD PRESSURE: 78 MMHG | WEIGHT: 247 LBS | HEART RATE: 56 BPM | BODY MASS INDEX: 34.45 KG/M2 | OXYGEN SATURATION: 95 % | TEMPERATURE: 97.3 F

## 2024-07-15 DIAGNOSIS — E78.5 HYPERLIPIDEMIA, UNSPECIFIED HYPERLIPIDEMIA TYPE: Primary | ICD-10-CM

## 2024-07-15 DIAGNOSIS — I10 ESSENTIAL HYPERTENSION: ICD-10-CM

## 2024-07-15 DIAGNOSIS — E78.5 HYPERLIPIDEMIA, UNSPECIFIED HYPERLIPIDEMIA TYPE: ICD-10-CM

## 2024-07-15 LAB
ALBUMIN SERPL BCP-MCNC: 4.9 G/DL (ref 3.4–5)
ALP SERPL-CCNC: 56 U/L (ref 33–136)
ALT SERPL W P-5'-P-CCNC: 33 U/L (ref 10–52)
ANION GAP SERPL CALC-SCNC: 13 MMOL/L (ref 10–20)
AST SERPL W P-5'-P-CCNC: 18 U/L (ref 9–39)
BILIRUB SERPL-MCNC: 0.9 MG/DL (ref 0–1.2)
BUN SERPL-MCNC: 18 MG/DL (ref 6–23)
CALCIUM SERPL-MCNC: 9.9 MG/DL (ref 8.6–10.3)
CHLORIDE SERPL-SCNC: 105 MMOL/L (ref 98–107)
CHOLEST SERPL-MCNC: 186 MG/DL (ref 0–199)
CHOLESTEROL/HDL RATIO: 2.9
CO2 SERPL-SCNC: 25 MMOL/L (ref 21–32)
CREAT SERPL-MCNC: 1.17 MG/DL (ref 0.5–1.3)
EGFRCR SERPLBLD CKD-EPI 2021: 69 ML/MIN/1.73M*2
ERYTHROCYTE [DISTWIDTH] IN BLOOD BY AUTOMATED COUNT: 13.3 % (ref 11.5–14.5)
GLUCOSE SERPL-MCNC: 94 MG/DL (ref 74–99)
HCT VFR BLD AUTO: 46.3 % (ref 41–52)
HDLC SERPL-MCNC: 64.5 MG/DL
HGB BLD-MCNC: 15.4 G/DL (ref 13.5–17.5)
LDLC SERPL CALC-MCNC: 104 MG/DL
MCH RBC QN AUTO: 29.2 PG (ref 26–34)
MCHC RBC AUTO-ENTMCNC: 33.3 G/DL (ref 32–36)
MCV RBC AUTO: 88 FL (ref 80–100)
NON HDL CHOLESTEROL: 122 MG/DL (ref 0–149)
NRBC BLD-RTO: 0 /100 WBCS (ref 0–0)
PLATELET # BLD AUTO: 224 X10*3/UL (ref 150–450)
POTASSIUM SERPL-SCNC: 5.1 MMOL/L (ref 3.5–5.3)
PROT SERPL-MCNC: 7 G/DL (ref 6.4–8.2)
RBC # BLD AUTO: 5.28 X10*6/UL (ref 4.5–5.9)
SODIUM SERPL-SCNC: 138 MMOL/L (ref 136–145)
TRIGL SERPL-MCNC: 86 MG/DL (ref 0–149)
TSH SERPL-ACNC: 2.43 MIU/L (ref 0.44–3.98)
VLDL: 17 MG/DL (ref 0–40)
WBC # BLD AUTO: 6.5 X10*3/UL (ref 4.4–11.3)

## 2024-07-15 PROCEDURE — 3078F DIAST BP <80 MM HG: CPT | Performed by: FAMILY MEDICINE

## 2024-07-15 PROCEDURE — 1036F TOBACCO NON-USER: CPT | Performed by: FAMILY MEDICINE

## 2024-07-15 PROCEDURE — 84443 ASSAY THYROID STIM HORMONE: CPT

## 2024-07-15 PROCEDURE — 1160F RVW MEDS BY RX/DR IN RCRD: CPT | Performed by: FAMILY MEDICINE

## 2024-07-15 PROCEDURE — 99214 OFFICE O/P EST MOD 30 MIN: CPT | Performed by: FAMILY MEDICINE

## 2024-07-15 PROCEDURE — 1157F ADVNC CARE PLAN IN RCRD: CPT | Performed by: FAMILY MEDICINE

## 2024-07-15 PROCEDURE — 80053 COMPREHEN METABOLIC PANEL: CPT

## 2024-07-15 PROCEDURE — 80061 LIPID PANEL: CPT

## 2024-07-15 PROCEDURE — 3075F SYST BP GE 130 - 139MM HG: CPT | Performed by: FAMILY MEDICINE

## 2024-07-15 PROCEDURE — 3008F BODY MASS INDEX DOCD: CPT | Performed by: FAMILY MEDICINE

## 2024-07-15 PROCEDURE — 36415 COLL VENOUS BLD VENIPUNCTURE: CPT

## 2024-07-15 PROCEDURE — G2211 COMPLEX E/M VISIT ADD ON: HCPCS | Performed by: FAMILY MEDICINE

## 2024-07-15 PROCEDURE — 85027 COMPLETE CBC AUTOMATED: CPT

## 2024-07-15 PROCEDURE — 1159F MED LIST DOCD IN RCRD: CPT | Performed by: FAMILY MEDICINE

## 2024-07-15 RX ORDER — TIZANIDINE 4 MG/1
TABLET ORAL
COMMUNITY
Start: 2022-12-04

## 2024-07-15 ASSESSMENT — ENCOUNTER SYMPTOMS
FEVER: 0
DIZZINESS: 1
COUGH: 0

## 2024-07-15 NOTE — PROGRESS NOTES
Subjective   Patient ID: Florentino Mccall is a 68 y.o. male who presents for Hypertension.    HPI     Here today for follow-up  Hypertension: Currently takes amlodipine 5 mg twice daily and lisinopril 20 mg twice daily.  We had given him HCTZ 12.5 mg daily at last visit, however he only took this medication for 1 week and then stopped because it caused nausea and dizziness  He checks his blood pressure out of the office and it often is in the 150s over 80s  Has history of hyperlipidemia currently taking ezetimibe 10 mg daily and pravastatin 10 mg nightly. He previously was not able to tolerate atorvastatin and rosuvastatin due to muscle cramping  Last lipid panel checked 8/9/2023 showed total cholesterol 176 and           Review of Systems   Constitutional:  Negative for fever.   Respiratory:  Negative for cough.    Cardiovascular:  Positive for leg swelling (Improved). Negative for chest pain.   Neurological:  Positive for dizziness (With HCTZ).       Objective   /78   Pulse 56   Temp 36.3 °C (97.3 °F) (Temporal)   Wt 112 kg (247 lb)   SpO2 95%   BMI 34.45 kg/m²     Physical Exam  Vitals reviewed.   Constitutional:       General: He is not in acute distress.     Appearance: Normal appearance. He is well-developed.   HENT:      Head: Normocephalic.   Eyes:      Conjunctiva/sclera: Conjunctivae normal.   Cardiovascular:      Rate and Rhythm: Normal rate and regular rhythm.      Heart sounds: Normal heart sounds.   Pulmonary:      Effort: Pulmonary effort is normal.      Breath sounds: Normal breath sounds.   Musculoskeletal:      Right lower leg: Edema present.      Left lower leg: Edema present.      Comments: Trace bilateral leg edema   Skin:     Findings: No rash.   Neurological:      Mental Status: He is alert.   Psychiatric:         Mood and Affect: Mood normal.         Behavior: Behavior normal.         Assessment/Plan   Assessment & Plan  Essential hypertension    Orders:    CBC; Future     Comprehensive Metabolic Panel; Future    Lipid Panel; Future    TSH with reflex to Free T4 if abnormal; Future    Hyperlipidemia, unspecified hyperlipidemia type    Orders:    CBC; Future    Comprehensive Metabolic Panel; Future    Lipid Panel; Future    TSH with reflex to Free T4 if abnormal; Future       Hypertension: His blood pressure today is at goal at 130/78.  Recommended that he continue to monitor blood pressure out of the office and call if getting any persistent elevated BP readings.  He has not been able to tolerate 2 different diuretics (HCTZ and chlorthalidone) in the past, and I would not want to start a beta-blocker at this time due to his heart rate being 56.  He will call us if getting any elevated BP readings out of the office, otherwise plan on rechecking at follow-up  Hyperlipidemia: Stable on pravastatin and ezetimibe with last LDL of 105.  We will recheck a lipid panel in the next 1 to 2 months  Follow-up in January for recheck and annual wellness visit

## 2024-07-15 NOTE — ASSESSMENT & PLAN NOTE
Orders:    CBC; Future    Comprehensive Metabolic Panel; Future    Lipid Panel; Future    TSH with reflex to Free T4 if abnormal; Future

## 2024-08-02 ENCOUNTER — HOSPITAL ENCOUNTER (OUTPATIENT)
Dept: CARDIOLOGY | Facility: HOSPITAL | Age: 69
Discharge: HOME | End: 2024-08-02
Payer: MEDICARE

## 2024-08-02 DIAGNOSIS — Z95.818 STATUS POST PLACEMENT OF IMPLANTABLE LOOP RECORDER: ICD-10-CM

## 2024-08-02 PROCEDURE — 93298 REM INTERROG DEV EVAL SCRMS: CPT

## 2024-08-10 DIAGNOSIS — E78.5 HYPERLIPIDEMIA, UNSPECIFIED HYPERLIPIDEMIA TYPE: ICD-10-CM

## 2024-08-12 ENCOUNTER — APPOINTMENT (OUTPATIENT)
Dept: CARDIOLOGY | Facility: CLINIC | Age: 69
End: 2024-08-12
Payer: MEDICARE

## 2024-08-12 ENCOUNTER — HOSPITAL ENCOUNTER (OUTPATIENT)
Dept: CARDIOLOGY | Facility: HOSPITAL | Age: 69
Discharge: HOME | End: 2024-08-12
Payer: MEDICARE

## 2024-08-12 VITALS
WEIGHT: 247 LBS | SYSTOLIC BLOOD PRESSURE: 148 MMHG | BODY MASS INDEX: 33.46 KG/M2 | DIASTOLIC BLOOD PRESSURE: 80 MMHG | HEIGHT: 72 IN | HEART RATE: 61 BPM

## 2024-08-12 DIAGNOSIS — I35.1 NONRHEUMATIC AORTIC VALVE INSUFFICIENCY: ICD-10-CM

## 2024-08-12 DIAGNOSIS — Z95.818 STATUS POST PLACEMENT OF IMPLANTABLE LOOP RECORDER: Primary | ICD-10-CM

## 2024-08-12 DIAGNOSIS — I51.9 MILD DIASTOLIC DYSFUNCTION: ICD-10-CM

## 2024-08-12 DIAGNOSIS — I77.810 AORTIC ROOT DILATATION (CMS-HCC): ICD-10-CM

## 2024-08-12 DIAGNOSIS — I10 ESSENTIAL HYPERTENSION: ICD-10-CM

## 2024-08-12 DIAGNOSIS — E78.2 MIXED HYPERLIPIDEMIA: ICD-10-CM

## 2024-08-12 DIAGNOSIS — Z95.818 STATUS POST PLACEMENT OF IMPLANTABLE LOOP RECORDER: ICD-10-CM

## 2024-08-12 DIAGNOSIS — R55 SYNCOPE AND COLLAPSE: ICD-10-CM

## 2024-08-12 PROCEDURE — 93291 INTERROG DEV EVAL SCRMS IP: CPT

## 2024-08-12 PROCEDURE — 99214 OFFICE O/P EST MOD 30 MIN: CPT | Performed by: NURSE PRACTITIONER

## 2024-08-12 PROCEDURE — 1160F RVW MEDS BY RX/DR IN RCRD: CPT | Performed by: NURSE PRACTITIONER

## 2024-08-12 PROCEDURE — 3008F BODY MASS INDEX DOCD: CPT | Performed by: NURSE PRACTITIONER

## 2024-08-12 PROCEDURE — 93000 ELECTROCARDIOGRAM COMPLETE: CPT | Mod: DISTINCT PROCEDURAL SERVICE | Performed by: INTERNAL MEDICINE

## 2024-08-12 PROCEDURE — 3079F DIAST BP 80-89 MM HG: CPT | Performed by: NURSE PRACTITIONER

## 2024-08-12 PROCEDURE — 3077F SYST BP >= 140 MM HG: CPT | Performed by: NURSE PRACTITIONER

## 2024-08-12 PROCEDURE — 1159F MED LIST DOCD IN RCRD: CPT | Performed by: NURSE PRACTITIONER

## 2024-08-12 PROCEDURE — 93291 INTERROG DEV EVAL SCRMS IP: CPT | Performed by: INTERNAL MEDICINE

## 2024-08-12 PROCEDURE — 1157F ADVNC CARE PLAN IN RCRD: CPT | Performed by: NURSE PRACTITIONER

## 2024-08-12 PROCEDURE — 1036F TOBACCO NON-USER: CPT | Performed by: NURSE PRACTITIONER

## 2024-08-12 RX ORDER — EZETIMIBE 10 MG/1
10 TABLET ORAL DAILY
Qty: 90 TABLET | Refills: 3 | Status: SHIPPED | OUTPATIENT
Start: 2024-08-12

## 2024-08-12 NOTE — PROGRESS NOTES
"CARDIOLOGY OFFICE VISIT      CHIEF COMPLAINT  Chief Complaint   Patient presents with    Follow-up     Pt is here today following up after 6 months with device        Chief complaint: \"I do get dizzy and lightheaded when I change positions but I think that is from my blood pressure.\"  HISTORY OF PRESENT ILLNESS  HPI  History: The patient is a 68-year-old  male who experienced a aziza syncopal episode in April, 2022 while in a restaurant.  He was evaluated in the emergency room and noted to have a normal left ventricular ejection fraction at 60 to 65% with mild aortic regurgitation.  Diastolic dysfunction was also noted.  Carotid ultrasound was negative.  Moderate plaque was noted in the LAD per CT scan.  Patient is followed for hypertension.  He reports that he always feels better when his blood pressure slightly higher.  He is on combination therapy consisting of amlodipine and lisinopril.  He underwent placement of a loop recorder on April 12, 2022 for evaluation of arrhythmia and correlation to symptom of dizziness and lightheadedness.  He presents to the office today for follow-up evaluation.  Patient reports that he does have transient dizziness and lightheadedness related to position changes.  He states he is only ever experienced 1 syncopal episode.  He denies palpitations, chest pain, shortness of breath, abdominal distention, orthopnea.  He does experience lower extremity swelling but attributes that to being on amlodipine.  Past Medical History  Past Medical History:   Diagnosis Date    Personal history of other diseases of the musculoskeletal system and connective tissue     History of osteopenia    Personal history of other diseases of the musculoskeletal system and connective tissue     History of osteoarthritis       Social History  Social History     Tobacco Use    Smoking status: Former     Current packs/day: 0.00     Average packs/day: 1 pack/day for 45.0 years (45.0 ttl pk-yrs)     Types: " Cigarettes     Start date:      Quit date:      Years since quittin.6    Smokeless tobacco: Never   Substance Use Topics    Alcohol use: Not Currently    Drug use: Not Currently       Family History     Family History   Problem Relation Name Age of Onset    Other (cardiac arrhythmia) Mother      Coronary artery disease Mother      Other (aortic root) Daughter      Other (enlarged aorta) Daughter          Allergies:  Allergies   Allergen Reactions    Sulindac Anaphylaxis     Takes meloxicam without problem        Outpatient Medications:  Current Outpatient Medications   Medication Instructions    albuterol 90 mcg/actuation inhaler 2 puffs, inhalation, Every 4 hours PRN    amLODIPine (NORVASC) 5 mg, oral, 2 times daily    aspirin 81 mg EC tablet oral    ezetimibe (ZETIA) 10 mg, oral, Daily    lisinopril 40 mg, oral, Daily    meloxicam (MOBIC) 15 mg, oral, Daily    omeprazole (PRILOSEC) 20 mg, oral, Daily before breakfast    pravastatin (PRAVACHOL) 10 mg, oral, Nightly    sildenafil (Viagra) 100 mg tablet 1 tablet, oral    sodium chloride 5 % ophthalmic ointment daily   at bedtime   os          REVIEW OF SYSTEMS  Review of Systems   All other systems reviewed and are negative.        VITALS  Vitals:    24 1027   BP: 136/80   Pulse: 61       PHYSICAL EXAM  Vitals and nursing note reviewed.   Constitutional:       Appearance: Normal appearance.   HENT:      Head: Normocephalic.   Neck:      Vascular: No JVD. Carotid upstrokes II/IV.  Cardiovascular:      Rate and Rhythm: Normal rate and regular rhythm.      Pulses: Normal pulses.      Heart sounds: Normal S1 S2, no S3 S4.  No murmurs or rubs.  Pulmonary:      Effort: Pulmonary effort is normal. Respirations regular and nonlabored.     Breath sounds: Clear to auscultation posterior laterally.  Abdominal:      General: Bowel sounds are normal.      Palpations: Abdomen is soft.   Musculoskeletal:         General: Normal range of motion.      Cervical  back: Normal range of motion.   Skin:     General: Skin is warm and dry.  Left parasternal loop recorder pocket is well-healed without redness swelling or drainage.  Neurological:      General: No focal deficit present.      Mental Status: Alert and oriented to person, place, and time.      Motor: Motor function is intact.   Psychiatric:         Attention and Perception: Attention and perception normal.         Mood and Affect: Mood and affect normal.         Speech: Speech normal.         Behavior: Behavior normal. Behavior is cooperative.         Thought Content: Thought content normal.         Cognition and Memory: Cognition and memory normal.     Labs and testing: Twelve-lead EKG reveals sinus rhythm with first-degree AV block, NH intervals 210 ms, QRS duration 90 ms,  ms, QTc 404 ms.  1 PVC is noted in the recording.  Loop recorder interrogations dated August 2, 2024, June 20, 2024 May 24, 2024 revealed no arrhythmic events or evidence of heart block or significant pauses.  Battery status is good.  2D echocardiogram dated February 27, 2024 reveals an ejection fraction of 60%, moderate concentric LVH, trace MR, mild TR, mild AR, moderate dilation of the ascending aorta and a right ventricular systolic pressure 35.7 mmHg.      ASSESSMENT AND PLAN    Clinical impressions:  1.  Syncope of unknown etiology status post loop recorder implant ([employer] Linq 2) on April 12, 2022 for evaluation of arrhythmia.  2.  Hypertension, blood pressure today 148/80.  3.  Normal left ventricular function per 2D echocardiogram dated February 27, 2024, ejection fraction 60% with moderate concentric LVH.  4.  Valvular heart disease consisting of trace MR, mild TR, mild AR per 2D echocardiogram dated February 27, 2024.  5.  Moderate dilation of the ascending aorta per 2D echocardiogram dated February 27, 2024.  6.  Dyslipidemia on statin.  7.  Mild coronary disease per CTA.  8.  Class I obesity with a BMI  33.50.    Recommendations:  1.  Continue current medications as prescribed.  2.  Lifestyle modifications were discussed.  The patient states that he drinks coffee all day long.  I discussed with the patient restricting caffeine consumption to 2 cups/day and increasing water consumption to 64 ounces per day.  3.  Obtain loop recorder downloads as scheduled.  4.  Follow-up in office with Dr. Robertson in 6 months or sooner if needed.    Evaluation and note by Neela Gutierrez, CNP  **Please excuse any errors in grammar or translation related to this dictation.  Voice recognition software was utilized to prepare this document.**

## 2024-08-15 VITALS — SYSTOLIC BLOOD PRESSURE: 136 MMHG | DIASTOLIC BLOOD PRESSURE: 80 MMHG

## 2024-08-15 DIAGNOSIS — M54.50 LOW BACK PAIN, UNSPECIFIED BACK PAIN LATERALITY, UNSPECIFIED CHRONICITY, UNSPECIFIED WHETHER SCIATICA PRESENT: ICD-10-CM

## 2024-08-15 RX ORDER — MELOXICAM 15 MG/1
15 TABLET ORAL DAILY
Qty: 30 TABLET | Refills: 2 | Status: SHIPPED | OUTPATIENT
Start: 2024-08-15

## 2024-09-06 ENCOUNTER — HOSPITAL ENCOUNTER (OUTPATIENT)
Dept: CARDIOLOGY | Facility: HOSPITAL | Age: 69
Discharge: HOME | End: 2024-09-06
Payer: MEDICARE

## 2024-09-06 DIAGNOSIS — Z95.818 STATUS POST PLACEMENT OF IMPLANTABLE LOOP RECORDER: ICD-10-CM

## 2024-09-06 DIAGNOSIS — R55 SYNCOPE AND COLLAPSE: ICD-10-CM

## 2024-09-06 DIAGNOSIS — Z95.818 STATUS POST PLACEMENT OF IMPLANTABLE LOOP RECORDER: Primary | ICD-10-CM

## 2024-09-13 ENCOUNTER — HOSPITAL ENCOUNTER (OUTPATIENT)
Dept: CARDIOLOGY | Facility: HOSPITAL | Age: 69
Discharge: HOME | End: 2024-09-13
Payer: MEDICARE

## 2024-09-13 DIAGNOSIS — R55 SYNCOPE AND COLLAPSE: ICD-10-CM

## 2024-09-13 DIAGNOSIS — Z95.818 STATUS POST PLACEMENT OF IMPLANTABLE LOOP RECORDER: ICD-10-CM

## 2024-09-13 PROCEDURE — 93298 REM INTERROG DEV EVAL SCRMS: CPT

## 2024-09-13 PROCEDURE — 93298 REM INTERROG DEV EVAL SCRMS: CPT | Performed by: INTERNAL MEDICINE

## 2024-10-15 DIAGNOSIS — N41.0 ACUTE PROSTATITIS: ICD-10-CM

## 2024-10-15 RX ORDER — CIPROFLOXACIN 500 MG/1
500 TABLET ORAL EVERY 12 HOURS
Qty: 30 TABLET | Refills: 0 | Status: SHIPPED | OUTPATIENT
Start: 2024-10-15 | End: 2024-10-30

## 2024-10-16 ENCOUNTER — LAB (OUTPATIENT)
Dept: LAB | Facility: LAB | Age: 69
End: 2024-10-16
Payer: COMMERCIAL

## 2024-10-16 ENCOUNTER — HOSPITAL ENCOUNTER (OUTPATIENT)
Dept: RADIOLOGY | Facility: HOSPITAL | Age: 69
Discharge: HOME | End: 2024-10-16
Payer: MEDICARE

## 2024-10-16 DIAGNOSIS — C67.9 MALIGNANT NEOPLASM OF URINARY BLADDER, UNSPECIFIED SITE (MULTI): ICD-10-CM

## 2024-10-16 DIAGNOSIS — N40.0 BENIGN PROSTATIC HYPERPLASIA, UNSPECIFIED WHETHER LOWER URINARY TRACT SYMPTOMS PRESENT: ICD-10-CM

## 2024-10-16 LAB
ALBUMIN SERPL BCP-MCNC: 4.6 G/DL (ref 3.4–5)
ALP SERPL-CCNC: 55 U/L (ref 33–136)
ALT SERPL W P-5'-P-CCNC: 25 U/L (ref 10–52)
ANION GAP SERPL CALC-SCNC: 12 MMOL/L (ref 10–20)
APPEARANCE UR: CLEAR
AST SERPL W P-5'-P-CCNC: 15 U/L (ref 9–39)
BASOPHILS # BLD AUTO: 0 X10*3/UL (ref 0–0.1)
BASOPHILS NFR BLD AUTO: 0 %
BILIRUB SERPL-MCNC: 0.7 MG/DL (ref 0–1.2)
BILIRUB UR STRIP.AUTO-MCNC: NEGATIVE MG/DL
BUN SERPL-MCNC: 19 MG/DL (ref 6–23)
CALCIUM SERPL-MCNC: 9.5 MG/DL (ref 8.6–10.3)
CHLORIDE SERPL-SCNC: 106 MMOL/L (ref 98–107)
CO2 SERPL-SCNC: 28 MMOL/L (ref 21–32)
COLOR UR: NORMAL
CREAT SERPL-MCNC: 1.2 MG/DL (ref 0.5–1.3)
EGFRCR SERPLBLD CKD-EPI 2021: 67 ML/MIN/1.73M*2
EOSINOPHIL # BLD AUTO: 0 X10*3/UL (ref 0–0.7)
EOSINOPHIL NFR BLD AUTO: 0 %
ERYTHROCYTE [DISTWIDTH] IN BLOOD BY AUTOMATED COUNT: 13.2 % (ref 11.5–14.5)
GLUCOSE SERPL-MCNC: 101 MG/DL (ref 74–99)
GLUCOSE UR STRIP.AUTO-MCNC: NORMAL MG/DL
HCT VFR BLD AUTO: 45 % (ref 41–52)
HGB BLD-MCNC: 15.3 G/DL (ref 13.5–17.5)
IMM GRANULOCYTES # BLD AUTO: 0.02 X10*3/UL (ref 0–0.7)
IMM GRANULOCYTES NFR BLD AUTO: 0.2 % (ref 0–0.9)
KETONES UR STRIP.AUTO-MCNC: NEGATIVE MG/DL
LEUKOCYTE ESTERASE UR QL STRIP.AUTO: NEGATIVE
LYMPHOCYTES # BLD AUTO: 1.8 X10*3/UL (ref 1.2–4.8)
LYMPHOCYTES NFR BLD AUTO: 21.5 %
MCH RBC QN AUTO: 29.3 PG (ref 26–34)
MCHC RBC AUTO-ENTMCNC: 34 G/DL (ref 32–36)
MCV RBC AUTO: 86 FL (ref 80–100)
MONOCYTES # BLD AUTO: 0.85 X10*3/UL (ref 0.1–1)
MONOCYTES NFR BLD AUTO: 10.2 %
NEUTROPHILS # BLD AUTO: 5.69 X10*3/UL (ref 1.2–7.7)
NEUTROPHILS NFR BLD AUTO: 68.1 %
NITRITE UR QL STRIP.AUTO: NEGATIVE
NRBC BLD-RTO: 0 /100 WBCS (ref 0–0)
PH UR STRIP.AUTO: 6 [PH]
PLATELET # BLD AUTO: 218 X10*3/UL (ref 150–450)
POTASSIUM SERPL-SCNC: 4.7 MMOL/L (ref 3.5–5.3)
PROT SERPL-MCNC: 6.7 G/DL (ref 6.4–8.2)
PROT UR STRIP.AUTO-MCNC: NEGATIVE MG/DL
PSA SERPL-MCNC: 0.72 NG/ML
RBC # BLD AUTO: 5.23 X10*6/UL (ref 4.5–5.9)
RBC # UR STRIP.AUTO: NEGATIVE /UL
SODIUM SERPL-SCNC: 141 MMOL/L (ref 136–145)
SP GR UR STRIP.AUTO: 1.02
UROBILINOGEN UR STRIP.AUTO-MCNC: NORMAL MG/DL
WBC # BLD AUTO: 8.4 X10*3/UL (ref 4.4–11.3)

## 2024-10-16 PROCEDURE — 76770 US EXAM ABDO BACK WALL COMP: CPT

## 2024-10-16 PROCEDURE — 76770 US EXAM ABDO BACK WALL COMP: CPT | Performed by: RADIOLOGY

## 2024-10-16 PROCEDURE — 80053 COMPREHEN METABOLIC PANEL: CPT

## 2024-10-16 PROCEDURE — 87086 URINE CULTURE/COLONY COUNT: CPT

## 2024-10-16 PROCEDURE — 84153 ASSAY OF PSA TOTAL: CPT

## 2024-10-16 PROCEDURE — 36415 COLL VENOUS BLD VENIPUNCTURE: CPT

## 2024-10-16 PROCEDURE — 85025 COMPLETE CBC W/AUTO DIFF WBC: CPT

## 2024-10-16 PROCEDURE — 88112 CYTOPATH CELL ENHANCE TECH: CPT

## 2024-10-16 PROCEDURE — 81003 URINALYSIS AUTO W/O SCOPE: CPT

## 2024-10-17 LAB — BACTERIA UR CULT: NO GROWTH

## 2024-10-18 ENCOUNTER — HOSPITAL ENCOUNTER (OUTPATIENT)
Dept: CARDIOLOGY | Facility: HOSPITAL | Age: 69
Discharge: HOME | End: 2024-10-18
Payer: MEDICARE

## 2024-10-18 DIAGNOSIS — Z95.818 STATUS POST PLACEMENT OF IMPLANTABLE LOOP RECORDER: ICD-10-CM

## 2024-10-18 DIAGNOSIS — R55 SYNCOPE AND COLLAPSE: ICD-10-CM

## 2024-10-18 LAB
LABORATORY COMMENT REPORT: NORMAL
LABORATORY COMMENT REPORT: NORMAL
PATH REPORT.FINAL DX SPEC: NORMAL
PATH REPORT.GROSS SPEC: NORMAL
PATH REPORT.RELEVANT HX SPEC: NORMAL
PATH REPORT.TOTAL CANCER: NORMAL

## 2024-10-18 PROCEDURE — 93298 REM INTERROG DEV EVAL SCRMS: CPT

## 2024-10-18 PROCEDURE — 93298 REM INTERROG DEV EVAL SCRMS: CPT | Performed by: INTERNAL MEDICINE

## 2024-10-29 RX ORDER — CIPROFLOXACIN 250 MG/1
250 TABLET, FILM COATED ORAL EVERY 12 HOURS
Qty: 4 TABLET | Refills: 0 | Status: SHIPPED | OUTPATIENT
Start: 2024-10-29 | End: 2024-10-31

## 2024-11-05 ENCOUNTER — APPOINTMENT (OUTPATIENT)
Dept: UROLOGY | Facility: CLINIC | Age: 69
End: 2024-11-05
Payer: MEDICARE

## 2024-11-05 VITALS
DIASTOLIC BLOOD PRESSURE: 89 MMHG | RESPIRATION RATE: 20 BRPM | SYSTOLIC BLOOD PRESSURE: 150 MMHG | WEIGHT: 245 LBS | HEART RATE: 72 BPM | BODY MASS INDEX: 33.18 KG/M2 | HEIGHT: 72 IN

## 2024-11-05 DIAGNOSIS — R39.15 URINARY URGENCY: ICD-10-CM

## 2024-11-05 DIAGNOSIS — R31.9 HEMATURIA, UNSPECIFIED TYPE: ICD-10-CM

## 2024-11-05 DIAGNOSIS — N40.1 BENIGN PROSTATIC HYPERPLASIA WITH LOWER URINARY TRACT SYMPTOMS, SYMPTOM DETAILS UNSPECIFIED: Primary | ICD-10-CM

## 2024-11-05 DIAGNOSIS — C67.9 MALIGNANT NEOPLASM OF URINARY BLADDER, UNSPECIFIED SITE (MULTI): ICD-10-CM

## 2024-11-05 DIAGNOSIS — C67.8 MALIGNANT NEOPLASM OF OVERLAPPING SITES OF BLADDER (MULTI): Primary | ICD-10-CM

## 2024-11-05 DIAGNOSIS — N40.1 BENIGN PROSTATIC HYPERPLASIA WITH LOWER URINARY TRACT SYMPTOMS, SYMPTOM DETAILS UNSPECIFIED: ICD-10-CM

## 2024-11-05 DIAGNOSIS — R30.0 DYSURIA: ICD-10-CM

## 2024-11-05 DIAGNOSIS — N41.0 ACUTE PROSTATITIS: ICD-10-CM

## 2024-11-05 LAB
POC APPEARANCE, URINE: CLEAR
POC BILIRUBIN, URINE: NEGATIVE
POC BLOOD, URINE: NEGATIVE
POC COLOR, URINE: YELLOW
POC GLUCOSE, URINE: NEGATIVE MG/DL
POC KETONES, URINE: NEGATIVE MG/DL
POC LEUKOCYTES, URINE: NEGATIVE
POC NITRITE,URINE: NEGATIVE
POC PH, URINE: 6 PH
POC PROTEIN, URINE: NEGATIVE MG/DL
POC SPECIFIC GRAVITY, URINE: 1.01
POC UROBILINOGEN, URINE: 0.2 EU/DL

## 2024-11-05 PROCEDURE — 52000 CYSTOURETHROSCOPY: CPT | Performed by: UROLOGY

## 2024-11-05 PROCEDURE — 51798 US URINE CAPACITY MEASURE: CPT | Performed by: UROLOGY

## 2024-11-05 PROCEDURE — 81003 URINALYSIS AUTO W/O SCOPE: CPT | Performed by: UROLOGY

## 2024-11-05 PROCEDURE — 99214 OFFICE O/P EST MOD 30 MIN: CPT | Performed by: UROLOGY

## 2024-11-05 RX ORDER — CIPROFLOXACIN 500 MG/1
500 TABLET ORAL EVERY 12 HOURS
Qty: 30 TABLET | Refills: 0 | Status: SHIPPED | OUTPATIENT
Start: 2024-11-05 | End: 2024-11-20

## 2024-11-05 RX ORDER — ALFUZOSIN HYDROCHLORIDE 10 MG/1
10 TABLET, EXTENDED RELEASE ORAL DAILY
Qty: 90 TABLET | Refills: 3 | Status: SHIPPED | OUTPATIENT
Start: 2024-11-05 | End: 2025-11-05

## 2024-11-05 NOTE — LETTER
November 5, 2024     Ed Tomlin DO  [address]  [place]  [facility] [address]    Patient: Florentino Mccall   YOB: 1955   Date of Visit: 11/5/2024       Dear Dr. Ed Tomlin DO:    Thank you for referring Florentino Mccall to me for evaluation. Below are my notes for this consultation.  If you have questions, please do not hesitate to call me. I look forward to following your patient along with you.       Sincerely,     Niko Jaramillo MD      CC: No Recipients  ______________________________________________________________________________________      Provider Impressions     68 year-old argumentative white male, with his wife, referred by Dr. Cleaning for a BLADDER MASS and GROSS HEMATURIA. A CAT scan of the abdomen and pelvis on 06/05/17, identified a question of a 1.3 cm BLADDER MASS. versus PROSTATIC ENLARGEMENT at the base of the bladder. Previous PSA on 05/18/17 was 0.9. Patient is a retired . He has a positive family history of prostate cancer, 2 brothers. He has a 45-pack-year cigarette smoking history.     07/01/17, unfortunately, the patient was under the impression he was to have all testing performed today including his cystoscopy. Urinalysis shows 10 red blood cells, urine culture is negative and urine cytology is negative for malignant cells. Patient describes significant URINARY URGENCY, NOCTURIA Ã--4, and URINARY HESITANCY. He is very unhappy with his urinary pattern and also the fact that there is a question of a growth within his bladder. We did review the possibilities including an enlarged prostate, a benign lesion or polyp, and bladder cancer which would relate to his cigarette smoking history. In any case, we have scheduled him for cystoscopy with urodynamic studies. He will benefit from both an alpha agents and antimuscarinic medications. Of course, we will evaluate for bladder tumor.     08/02/17, CYSTOSCOPY with URODYNAMIC performed. URODYNAMICS reveals a HYPERREFLEXIC  BLADDER and PROSTATIC OBSTRUCTION. We will initiate Flomax and Myrbetric at the 25 mg dose. In addition, significant PROSTATITIS was seen and we will initiate a two-week course of ciprofloxacin 500 mg by mouth every 12 hours. 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 The remainder of the bladder is clean. He will undergo a TURBT with placement of a right ureteral catheter on 08/17/17. Dr. Cleaning will provide preoperative medical risk stratification.     08/17/17, TURBT, OR, BLADDER CANCER, stage TA low grade disease, no therapy.     11/03/17, now 3 months status post his diagnosis of BLADDER CANCER. CYSTOSCOPY today is negative. Cytology is negative. Urine cultures negative. Creatinine 1.24. PSA 0.8. IVP is negative. He will return in 3 months. He will continue on Flomax and Myrbetric.     02/20/18, now 6 months status post his last BLADDER CANCER, stage TA low grade, no therapy. Today CYSTOSCOPY is negative. Cytology is negative. Urinalysis and urine culture negative. PSA is 0.7. He will maintain Flomax and Myrbetric. He will return in 3 months.     05/21/18, now 9 months status post his last BLADDER CANCER, stage TA low grade disease, no therapy. Today's surveillance CYSTOSCOPY is negative for recurrent tumor. Cytology is negative for malignant cells. He will return in 3 months. He will continue on Flomax and Myrbetric at the 25 mg dose.     08/24/18, now 1 year status post his last BLADDER CANCER, stage TA low grade disease, no therapy. Today's SURVEILLANCE CYSTOSCOPY is negative for recurrent tumor. Cytology is negative for malignant cells. He will return in 3 months. He will continue on Flomax and Myrbetric at the 25 mg dose.     11/20/18, now 1 year and 3 months status post his last BLADDER CANCER, stage TA low grade disease, no therapy. Today's surveillance CYSTOSCOPY is negative for recurrent tumor. Flow rate of  6 with a PVR 37. He has discontinued his Myrbetric. He continues on Flomax daily. Urinalysis, urine culture, urine cytology are all negative. PSA is 1.2. IVP is negative for any filling defects. He will return in 3 months.     03/30/19, now 1 year and 7 months status post his last Bladder Cancer, stage TA low grade disease, no therapy. Today's SURVEILLANCE CYSTOSCOPY is negative for recurrent tumor. He continues on daily Flomax. He will return in 3 months.     07/01/19, now 1 year and 11 months status post his last bladder cancer, stage TA low grade disease, no therapy. Today's surveillance cystoscopy is negative for recurrent tumor. It was uncomfortable at the prostatic urethra and we will transition to 10 minutes of lidocaine. He is also changed from Flomax to daily Uroxatral 10 mg. He will return in 3 months. Flow rate of 8 with a PVR of 12.     August 2019, 66-year-old brother underwent radical prostatectomy for prostate cancer     10/04/19, now 2 years and 2 months status post his last bladder cancer, stage TA low grade disease, no therapy. Today's cystoscopy is negative for recurrent tumor. He continues on daily Uroxatral. Flow rate of 24 with a PVR 65. He will return in 3 months for his 1 year visit with appropriate laboratories visit.     02/05/20, cystoscopy today does not reveal any evidence of recurrent tumor. Flow rate of 5 cc/s with a PVR 16 cc. Now 2 years and 6 months status post his last bladder cancer, stage TA, low-grade disease, no therapy. He continues on daily Uroxatral. Urinalysis, urine culture and urine cytology are negative PSA 0.8. Hematocrit 46%. IVP negative. Creatinine 1.3. The patient's brother, Greg, was diagnosed with [place] 7 prostate cancer on 08/14/19 and underwent a radical retropubic prostatectomy. The patient's brother Greg presenting PSA was 2.95. He will now be on a 6 month schedule.     06/29/20, cystoscopy today does not reveal any evidence of recurrent tumor. Flow rate  of 5 cc/s with a PVR of 100 cc. He is now 2 years and 10 months status post his last bladder cancer, stage TA, low-grade disease with no therapy. He continues on daily Uroxatral. Creatinine is 1.35 in stable. We will see him again in November.     11/30/20, cystoscopy today does not reveal any evidence of recurrent tumor. Flow rate of 6 cc/s with a PVR of 61 cc. He is now 3 years and 3 months status post his last bladder cancer, stage TA low grade disease with no therapy. He continues on Uroxatral. IVP is negative for mass or stones. Creatinine 1.20. Hematocrit 45%. PSA 1.0. Urinalysis no blood, urine culture no growth, urine cytology is negative for malignant cells. He will return in 6 months     May 24, 2021, cystoscopy today once again does not reveal any evidence of recurrent tumor. Some prostatitis today. Flow rate 21 cc/s, total volume 183 cc, PVR 26 cc. He is on daily Uroxatrol. He is now 3 years and 9 months status post his last bladder cancer, stage TA low-grade disease, with no therapy. He will return in 6 months.     November 17, 2021, cystoscopy today does not reveal any evidence of recurrent tumor. Flow rate 15 cc/s, total volume 110 cc, PVR 35 cc. He continues on daily Uroxatrol. He is now 4 years status post his last bladder cancer, stage TA low-grade disease, no therapy. IVP does not show any suspicious findings of upper tract urothelial cancer. Urine culture no growth. Urinalysis no blood. Urine cytology is lacking atypia. PSA 0.80. Creatinine 1.26. Hematocrit 44.7%. He will return in 1 year.     February 7, 2023, cystoscopy today does not reveal any evidence of recurrent bladder cancer. Flow rate of 13 cc/s, total volume 94 cc, PVR 18 cc. He continues on Uroxatrol with excellent results. He is now 5 years status post his last bladder cancer, stage TA low-grade disease, no therapy. Renal ultrasound does not show any suspicious lesions or hydronephrosis. Urinalysis shows no blood, urine culture no  growth, urine cytology saw 1 area of atypical cells which could be from a neoplastic process. Creatinine 1.10. PSA 1.05. Hematocrit 43.8%. He will return in 1 year.     November 7, 2023, cystoscopy today does not reveal any evidence of recurrent bladder tumor or cancer.  Now 5 years status post his last bladder cancer, stage TA low-grade disease, no therapy.  Patient continues on Uroxatrol with excellent results.  Flow rate of 16 cc/s with a PVR of 10 cc.  Creatinine 1.16.  Hematocrit 46.3%.  PSA 0.82.  Urine culture no growth.  Urine cytology is atypical cannot rule out neoplasm.  Renal ultrasound is unremarkable.  He will return in 1 year.      November 5, 2024, cystoscopy today does not identify any evidence of recurrent bladder tumor.  He is now 6 years status post his last bladder cancer, stage TA low-grade disease, no therapy.  He is maintained on daily Uroxatrol with a PVR today of 28 cc.  He states he does have some terminal dribbling, however he refuses to be prescribed another medication.  Renal ultrasound is unremarkable.  PSA is normal at 0.72.  Patient now has a second brother diagnosed with prostate cancer.  Hematocrit 45%.  Creatinine 1.20.  Urine culture no growth.  Urinalysis was negative for blood.  Urine cytology was atypical, cannot rule out neoplasm.  He will return in 1 year.     PLAN:     #1 patient will continue Uroxatral 10 mg by mouth daily      #2 patient will return for cystoscopy, PVR, urine studies, blood studies, PSA, renal ultrasound in November 2025.  2 separate LIDOCAINE gel 10 minutes PRIOR.     Physical Exam  Vitals and nursing note reviewed. Exam conducted with a chaperone present.   Constitutional:       Appearance: Normal appearance.   HENT:      Head: Normocephalic and atraumatic.   Pulmonary:      Effort: Pulmonary effort is normal.   Abdominal:      Palpations: Abdomen is soft.      Tenderness: There is no abdominal tenderness.   Genitourinary:     Penis: Normal.        Testes: Normal.   Musculoskeletal:         General: Normal range of motion.      Cervical back: Normal range of motion and neck supple.   Neurological:      General: No focal deficit present.      Mental Status: He is alert and oriented to person, place, and time.   Psychiatric:         Mood and Affect: Mood normal.         Behavior: Behavior normal.          This note was created with voice-recognition software and was not corrected for typographical or grammatical errors.

## 2024-11-05 NOTE — PATIENT INSTRUCTIONS
Patient Discussion/Summary     It was nice to see you once again. Your cystoscopy does not reveal any evidence of recurrence. It is now 6 years since your original diagnosis of bladder cancer.  Have a very little residual on daily Uroxatrol. Your kidney ultrasound did not show any spread of the cancer to the kidneys.  Urine culture did not show any infection.  We will see you again in November      This note was created with voice-recognition software and was not corrected for typographical or grammatical errors

## 2024-11-05 NOTE — PROGRESS NOTES
"Pt took Cipro 250om po as prescribed  Anesthesia: Local 2% Lidocaine  Instruments: 6F flexible disposable Cystoscope    Pt brought to procedure room and placed in dorsal lithotomy position. Pt draped and prepped in normal sterile fashion. 5ml lidocaine x2 instilled into urethral meatus and 5ml instilled x2 into urethra (penis). Pt tolerated well.    I was present as chaperone for the entirety of the procedure   Eva Matias  Cystoscopy performed by Dr. Niko Jaramillo      Bedside \"Time Out\" Verification   Today's Date:  11/5/2024. I attest that this time out verification took place prior to the procedure.   Procedure: Cysto   RN/LPN/MA:    Provider: [facility].   Verified By: MA, Eva Matias and Provider, Dr. Niko Jaramillo.   Prior to the start of the procedure a time out was taken and the following were verified: the identity of the patient using two patient identifiers, the correct procedure, the correct site marked as indicated, the correct positioning for the patient and the correct equipment was obtained.   Cystoscopy - Florentino Mccall -identified using two (2) forms of identification.   Procedure: diagnostic Cystourethroscopy   Procedure Note: Time Started: 10:30am   Time Completed: 10:53 AM  Indications for procedure: Cysto- Surveillance of atypical cells.          Discussed with patient: Risks, benefits, and alternative were discussed in detail. Patient appears to understand and agrees to proceed. Patient has signed the procedure consent form.    CYSTOSCOPY:    Cystoscopy today reveals a normal distal urethra and external sphincter.  Moderately obstructed prostatic urethra and minimally elevated median lobe.  Once inside the bladder, no evidence of stones, lesions or recurrent bladder tumor.  PVR 28 cc.  "

## 2024-11-05 NOTE — PROGRESS NOTES
Provider Impressions     68 year-old argumentative white male, with his wife, referred by Dr. Cleaning for a BLADDER MASS and GROSS HEMATURIA. A CAT scan of the abdomen and pelvis on 06/05/17, identified a question of a 1.3 cm BLADDER MASS. versus PROSTATIC ENLARGEMENT at the base of the bladder. Previous PSA on 05/18/17 was 0.9. Patient is a retired . He has a positive family history of prostate cancer, 2 brothers. He has a 45-pack-year cigarette smoking history.     07/01/17, unfortunately, the patient was under the impression he was to have all testing performed today including his cystoscopy. Urinalysis shows 10 red blood cells, urine culture is negative and urine cytology is negative for malignant cells. Patient describes significant URINARY URGENCY, NOCTURIA Ã--4, and URINARY HESITANCY. He is very unhappy with his urinary pattern and also the fact that there is a question of a growth within his bladder. We did review the possibilities including an enlarged prostate, a benign lesion or polyp, and bladder cancer which would relate to his cigarette smoking history. In any case, we have scheduled him for cystoscopy with urodynamic studies. He will benefit from both an alpha agents and antimuscarinic medications. Of course, we will evaluate for bladder tumor.     08/02/17, CYSTOSCOPY with URODYNAMIC performed. URODYNAMICS reveals a HYPERREFLEXIC BLADDER and PROSTATIC OBSTRUCTION. We will initiate Flomax and Myrbetric at the 25 mg dose. In addition, significant PROSTATITIS was seen and we will initiate a two-week course of ciprofloxacin 500 mg by mouth every 12 hours. 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 The remainder of the bladder is clean. He will undergo a TURBT with placement of a right ureteral catheter on 08/17/17. Dr. Cleaning will provide preoperative medical risk stratification.     08/17/17,  TURBT, OR, BLADDER CANCER, stage TA low grade disease, no therapy.     11/03/17, now 3 months status post his diagnosis of BLADDER CANCER. CYSTOSCOPY today is negative. Cytology is negative. Urine cultures negative. Creatinine 1.24. PSA 0.8. IVP is negative. He will return in 3 months. He will continue on Flomax and Myrbetric.     02/20/18, now 6 months status post his last BLADDER CANCER, stage TA low grade, no therapy. Today CYSTOSCOPY is negative. Cytology is negative. Urinalysis and urine culture negative. PSA is 0.7. He will maintain Flomax and Myrbetric. He will return in 3 months.     05/21/18, now 9 months status post his last BLADDER CANCER, stage TA low grade disease, no therapy. Today's surveillance CYSTOSCOPY is negative for recurrent tumor. Cytology is negative for malignant cells. He will return in 3 months. He will continue on Flomax and Myrbetric at the 25 mg dose.     08/24/18, now 1 year status post his last BLADDER CANCER, stage TA low grade disease, no therapy. Today's SURVEILLANCE CYSTOSCOPY is negative for recurrent tumor. Cytology is negative for malignant cells. He will return in 3 months. He will continue on Flomax and Myrbetric at the 25 mg dose.     11/20/18, now 1 year and 3 months status post his last BLADDER CANCER, stage TA low grade disease, no therapy. Today's surveillance CYSTOSCOPY is negative for recurrent tumor. Flow rate of 6 with a PVR 37. He has discontinued his Myrbetric. He continues on Flomax daily. Urinalysis, urine culture, urine cytology are all negative. PSA is 1.2. IVP is negative for any filling defects. He will return in 3 months.     03/30/19, now 1 year and 7 months status post his last Bladder Cancer, stage TA low grade disease, no therapy. Today's SURVEILLANCE CYSTOSCOPY is negative for recurrent tumor. He continues on daily Flomax. He will return in 3 months.     07/01/19, now 1 year and 11 months status post his last bladder cancer, stage TA low grade disease,  no therapy. Today's surveillance cystoscopy is negative for recurrent tumor. It was uncomfortable at the prostatic urethra and we will transition to 10 minutes of lidocaine. He is also changed from Flomax to daily Uroxatral 10 mg. He will return in 3 months. Flow rate of 8 with a PVR of 12.     August 2019, 66-year-old brother underwent radical prostatectomy for prostate cancer     10/04/19, now 2 years and 2 months status post his last bladder cancer, stage TA low grade disease, no therapy. Today's cystoscopy is negative for recurrent tumor. He continues on daily Uroxatral. Flow rate of 24 with a PVR 65. He will return in 3 months for his 1 year visit with appropriate laboratories visit.     02/05/20, cystoscopy today does not reveal any evidence of recurrent tumor. Flow rate of 5 cc/s with a PVR 16 cc. Now 2 years and 6 months status post his last bladder cancer, stage TA, low-grade disease, no therapy. He continues on daily Uroxatral. Urinalysis, urine culture and urine cytology are negative PSA 0.8. Hematocrit 46%. IVP negative. Creatinine 1.3. The patient's brother, Greg, was diagnosed with [place] 7 prostate cancer on 08/14/19 and underwent a radical retropubic prostatectomy. The patient's brother Greg presenting PSA was 2.95. He will now be on a 6 month schedule.     06/29/20, cystoscopy today does not reveal any evidence of recurrent tumor. Flow rate of 5 cc/s with a PVR of 100 cc. He is now 2 years and 10 months status post his last bladder cancer, stage TA, low-grade disease with no therapy. He continues on daily Uroxatral. Creatinine is 1.35 in stable. We will see him again in November.     11/30/20, cystoscopy today does not reveal any evidence of recurrent tumor. Flow rate of 6 cc/s with a PVR of 61 cc. He is now 3 years and 3 months status post his last bladder cancer, stage TA low grade disease with no therapy. He continues on Uroxatral. IVP is negative for mass or stones. Creatinine 1.20.  Hematocrit 45%. PSA 1.0. Urinalysis no blood, urine culture no growth, urine cytology is negative for malignant cells. He will return in 6 months     May 24, 2021, cystoscopy today once again does not reveal any evidence of recurrent tumor. Some prostatitis today. Flow rate 21 cc/s, total volume 183 cc, PVR 26 cc. He is on daily Uroxatrol. He is now 3 years and 9 months status post his last bladder cancer, stage TA low-grade disease, with no therapy. He will return in 6 months.     November 17, 2021, cystoscopy today does not reveal any evidence of recurrent tumor. Flow rate 15 cc/s, total volume 110 cc, PVR 35 cc. He continues on daily Uroxatrol. He is now 4 years status post his last bladder cancer, stage TA low-grade disease, no therapy. IVP does not show any suspicious findings of upper tract urothelial cancer. Urine culture no growth. Urinalysis no blood. Urine cytology is lacking atypia. PSA 0.80. Creatinine 1.26. Hematocrit 44.7%. He will return in 1 year.     February 7, 2023, cystoscopy today does not reveal any evidence of recurrent bladder cancer. Flow rate of 13 cc/s, total volume 94 cc, PVR 18 cc. He continues on Uroxatrol with excellent results. He is now 5 years status post his last bladder cancer, stage TA low-grade disease, no therapy. Renal ultrasound does not show any suspicious lesions or hydronephrosis. Urinalysis shows no blood, urine culture no growth, urine cytology saw 1 area of atypical cells which could be from a neoplastic process. Creatinine 1.10. PSA 1.05. Hematocrit 43.8%. He will return in 1 year.     November 7, 2023, cystoscopy today does not reveal any evidence of recurrent bladder tumor or cancer.  Now 5 years status post his last bladder cancer, stage TA low-grade disease, no therapy.  Patient continues on Uroxatrol with excellent results.  Flow rate of 16 cc/s with a PVR of 10 cc.  Creatinine 1.16.  Hematocrit 46.3%.  PSA 0.82.  Urine culture no growth.  Urine cytology is  atypical cannot rule out neoplasm.  Renal ultrasound is unremarkable.  He will return in 1 year.      November 5, 2024, cystoscopy today does not identify any evidence of recurrent bladder tumor.  He is now 6 years status post his last bladder cancer, stage TA low-grade disease, no therapy.  He is maintained on daily Uroxatrol with a PVR today of 28 cc.  He states he does have some terminal dribbling, however he refuses to be prescribed another medication.  Renal ultrasound is unremarkable.  PSA is normal at 0.72.  Patient now has a second brother diagnosed with prostate cancer.  Hematocrit 45%.  Creatinine 1.20.  Urine culture no growth.  Urinalysis was negative for blood.  Urine cytology was atypical, cannot rule out neoplasm.  He will return in 1 year.     PLAN:     #1 patient will continue Uroxatral 10 mg by mouth daily      #2 patient will return for cystoscopy, PVR, urine studies, blood studies, PSA, renal ultrasound in November 2025.  2 separate LIDOCAINE gel 10 minutes PRIOR.     Physical Exam  Vitals and nursing note reviewed. Exam conducted with a chaperone present.   Constitutional:       Appearance: Normal appearance.   HENT:      Head: Normocephalic and atraumatic.   Pulmonary:      Effort: Pulmonary effort is normal.   Abdominal:      Palpations: Abdomen is soft.      Tenderness: There is no abdominal tenderness.   Genitourinary:     Penis: Normal.       Testes: Normal.   Musculoskeletal:         General: Normal range of motion.      Cervical back: Normal range of motion and neck supple.   Neurological:      General: No focal deficit present.      Mental Status: He is alert and oriented to person, place, and time.   Psychiatric:         Mood and Affect: Mood normal.         Behavior: Behavior normal.          This note was created with voice-recognition software and was not corrected for typographical or grammatical errors.

## 2024-11-17 DIAGNOSIS — M54.50 LOW BACK PAIN, UNSPECIFIED BACK PAIN LATERALITY, UNSPECIFIED CHRONICITY, UNSPECIFIED WHETHER SCIATICA PRESENT: ICD-10-CM

## 2024-11-18 RX ORDER — MELOXICAM 15 MG/1
15 TABLET ORAL DAILY
Qty: 30 TABLET | Refills: 2 | Status: SHIPPED | OUTPATIENT
Start: 2024-11-18

## 2024-11-22 ENCOUNTER — HOSPITAL ENCOUNTER (OUTPATIENT)
Dept: CARDIOLOGY | Facility: HOSPITAL | Age: 69
Discharge: HOME | End: 2024-11-22
Payer: MEDICARE

## 2024-11-22 DIAGNOSIS — R55 SYNCOPE AND COLLAPSE: ICD-10-CM

## 2024-11-22 DIAGNOSIS — Z95.818 STATUS POST PLACEMENT OF IMPLANTABLE LOOP RECORDER: ICD-10-CM

## 2024-11-22 PROCEDURE — 93298 REM INTERROG DEV EVAL SCRMS: CPT

## 2024-12-27 ENCOUNTER — HOSPITAL ENCOUNTER (OUTPATIENT)
Dept: CARDIOLOGY | Facility: HOSPITAL | Age: 69
Discharge: HOME | End: 2024-12-27
Payer: MEDICARE

## 2024-12-27 DIAGNOSIS — R55 SYNCOPE AND COLLAPSE: ICD-10-CM

## 2024-12-27 DIAGNOSIS — Z95.818 STATUS POST PLACEMENT OF IMPLANTABLE LOOP RECORDER: ICD-10-CM

## 2024-12-27 PROCEDURE — 93298 REM INTERROG DEV EVAL SCRMS: CPT

## 2025-01-08 ENCOUNTER — TELEMEDICINE (OUTPATIENT)
Dept: UROLOGY | Facility: HOSPITAL | Age: 70
End: 2025-01-08
Payer: MEDICARE

## 2025-01-08 DIAGNOSIS — N52.9 ERECTILE DYSFUNCTION, UNSPECIFIED ERECTILE DYSFUNCTION TYPE: Primary | ICD-10-CM

## 2025-01-08 PROCEDURE — 1157F ADVNC CARE PLAN IN RCRD: CPT | Performed by: UROLOGY

## 2025-01-08 PROCEDURE — 1159F MED LIST DOCD IN RCRD: CPT | Performed by: UROLOGY

## 2025-01-08 PROCEDURE — 1160F RVW MEDS BY RX/DR IN RCRD: CPT | Performed by: UROLOGY

## 2025-01-08 PROCEDURE — 1036F TOBACCO NON-USER: CPT | Performed by: UROLOGY

## 2025-01-08 PROCEDURE — 99214 OFFICE O/P EST MOD 30 MIN: CPT | Performed by: UROLOGY

## 2025-01-08 NOTE — PROGRESS NOTES
HPI    69 y.o. male being seen with the following problem list:    Problem list:  ED, Viagra 100 mg    01/08/25 - Seen today over telehealth, performed this visit using real-time telehealth tools, including an audio/video connection between Florentino Umanapranav at home and Clark Rubio MD at [facility]. Consent for telemedicine visit was obtained.   On Viagra 100 mg for ED for about 15 years, started with lower dose but gradually increased overtime. Medication not effective since the past 3-4 years, also notes some hypotension after usage. Would like to avoid future systemic meds.      Lab Results   Component Value Date    PSA 0.72 10/16/2024    PSA 0.82 10/11/2023    PSA 1.05 12/28/2022    PSA 0.80 10/27/2021    PSA 1.0 11/23/2020              Current Medications:  Current Outpatient Medications   Medication Sig Dispense Refill    albuterol 90 mcg/actuation inhaler Inhale 2 puffs every 4 hours if needed.      alfuzosin (UroxatraL) 10 mg 24 hr tablet Take 1 tablet (10 mg) by mouth once daily. Do not crush, chew, or split. 90 tablet 3    amLODIPine (Norvasc) 5 mg tablet Take 1 tablet (5 mg) by mouth 2 times a day. 180 tablet 2    aspirin 81 mg EC tablet Take by mouth.      ezetimibe (Zetia) 10 mg tablet TAKE 1 TABLET BY MOUTH ONCE DAILY 90 tablet 3    lisinopril 40 mg tablet Take 1 tablet (40 mg) by mouth once daily. 90 tablet 2    meloxicam (Mobic) 15 mg tablet Take 1 tablet (15 mg) by mouth once daily. 30 tablet 2    omeprazole (PriLOSEC) 20 mg DR capsule Take 1 capsule (20 mg) by mouth once daily in the morning. Take before meals. 90 capsule 2    pravastatin (Pravachol) 10 mg tablet Take 1 tablet (10 mg) by mouth once daily at bedtime. 90 tablet 3    sildenafil (Viagra) 100 mg tablet Take 1 tablet (100 mg) by mouth.      sodium chloride 5 % ophthalmic ointment daily   at bedtime   os       No current facility-administered medications for this visit.        Active Problems:  Florentino Mccall is a 69 y.o. male with the  following Problems and Medications.  Patient Active Problem List   Diagnosis    Lumbar radiculopathy    Lumbar stenosis    Aortic root dilatation (CMS-HCC)    Bladder mass    BPH (benign prostatic hyperplasia)    Cataract, bilateral    Degenerative arthritis of lumbar spine    Erectile dysfunction    Essential hypertension    Gastroesophageal reflux disease    Hiatal hernia    Herpes simplex    Hyperlipidemia    Hypogonadism, male    Left knee DJD    Legal blindness, as defined in [place]    Malignant tumor of urinary bladder (Multi)    Mild diastolic dysfunction    Nonrheumatic aortic valve insufficiency    Retinitis pigmentosa    Syncope and collapse    Tear of retina without detachment    Retinal detachment    Cervical radiculopathy    Esophageal web (HHS-HCC)    Hematuria    Neck pain    Snoring    Urinary urgency    Vitiligo    BMI 34.0-34.9,adult    History of meningitis    Status post placement of implantable loop recorder     Current Outpatient Medications   Medication Sig Dispense Refill    albuterol 90 mcg/actuation inhaler Inhale 2 puffs every 4 hours if needed.      alfuzosin (UroxatraL) 10 mg 24 hr tablet Take 1 tablet (10 mg) by mouth once daily. Do not crush, chew, or split. 90 tablet 3    amLODIPine (Norvasc) 5 mg tablet Take 1 tablet (5 mg) by mouth 2 times a day. 180 tablet 2    aspirin 81 mg EC tablet Take by mouth.      ezetimibe (Zetia) 10 mg tablet TAKE 1 TABLET BY MOUTH ONCE DAILY 90 tablet 3    lisinopril 40 mg tablet Take 1 tablet (40 mg) by mouth once daily. 90 tablet 2    meloxicam (Mobic) 15 mg tablet Take 1 tablet (15 mg) by mouth once daily. 30 tablet 2    omeprazole (PriLOSEC) 20 mg DR capsule Take 1 capsule (20 mg) by mouth once daily in the morning. Take before meals. 90 capsule 2    pravastatin (Pravachol) 10 mg tablet Take 1 tablet (10 mg) by mouth once daily at bedtime. 90 tablet 3    sildenafil (Viagra) 100 mg tablet Take 1 tablet (100 mg) by mouth.      sodium chloride 5 %  ophthalmic ointment daily   at bedtime   os       No current facility-administered medications for this visit.       PMH:  Past Medical History:   Diagnosis Date    Personal history of other diseases of the musculoskeletal system and connective tissue     History of osteopenia    Personal history of other diseases of the musculoskeletal system and connective tissue     History of osteoarthritis       PSH:  Past Surgical History:   Procedure Laterality Date    CATARACT EXTRACTION  2017    Cataract Surgery    COLONOSCOPY  2017    Complete Colonoscopy    KNEE ARTHROSCOPY W/ DEBRIDEMENT  2017    Knee Arthroscopy (Therapeutic)    OTHER SURGICAL HISTORY  2021    Bladder surgery    OTHER SURGICAL HISTORY  2021    Retinal detachment repair    OTHER SURGICAL HISTORY  2021    Transurethral resection of bladder tumor    OTHER SURGICAL HISTORY  2021    Cataract surgery    RETINAL DETACHMENT SURGERY  2017    Repair Of Retinal Detachment    ROTATOR CUFF REPAIR  2017    Rotator Cuff Repair    VASECTOMY  2017    Surgery Vas Deferens Vasectomy       FMH:  Family History   Problem Relation Name Age of Onset    Other (cardiac arrhythmia) Mother      Coronary artery disease Mother      Other (aortic root) Daughter      Other (enlarged aorta) Daughter         SHx:  Social History     Tobacco Use    Smoking status: Former     Current packs/day: 0.00     Average packs/day: 1 pack/day for 45.0 years (45.0 ttl pk-yrs)     Types: Cigarettes     Start date:      Quit date:      Years since quittin.0    Smokeless tobacco: Never   Substance Use Topics    Alcohol use: Not Currently    Drug use: Not Currently       Allergies:  Allergies   Allergen Reactions    Sulindac Anaphylaxis     Takes meloxicam without problem       Assessment/Plan  ED, has tried Viagra for the past 15 years, not effective now, also notes hypotension after medication use. I would not recommend Cialis,  given risk of hypotension and longer duration of action. Discussed other options including vacuum assist erection devices, injection therapy, and penile implant.  We discussed their attendant risks and benefits. He opted to proceed with penile injections, will order Trimix inj, syringes. Will set up an appointment with RN in [place], for instructions and guidance on how to use it.      Scribe Attestation  By signing my name below, I, Victor M Sorto, attest that this documentation has been prepared under the direction and in the presence of Clark Rubio MD.

## 2025-01-15 ENCOUNTER — APPOINTMENT (OUTPATIENT)
Dept: PRIMARY CARE | Facility: CLINIC | Age: 70
End: 2025-01-15
Payer: MEDICARE

## 2025-01-15 VITALS
WEIGHT: 251 LBS | DIASTOLIC BLOOD PRESSURE: 75 MMHG | OXYGEN SATURATION: 96 % | HEART RATE: 62 BPM | SYSTOLIC BLOOD PRESSURE: 118 MMHG | BODY MASS INDEX: 34.04 KG/M2 | TEMPERATURE: 97.5 F

## 2025-01-15 DIAGNOSIS — C67.9 MALIGNANT NEOPLASM OF URINARY BLADDER, UNSPECIFIED SITE (MULTI): ICD-10-CM

## 2025-01-15 DIAGNOSIS — E78.5 HYPERLIPIDEMIA, UNSPECIFIED HYPERLIPIDEMIA TYPE: ICD-10-CM

## 2025-01-15 DIAGNOSIS — Z00.00 ROUTINE GENERAL MEDICAL EXAMINATION AT HEALTH CARE FACILITY: Primary | ICD-10-CM

## 2025-01-15 DIAGNOSIS — I10 ESSENTIAL HYPERTENSION: ICD-10-CM

## 2025-01-15 PROCEDURE — 1036F TOBACCO NON-USER: CPT | Performed by: FAMILY MEDICINE

## 2025-01-15 PROCEDURE — 1123F ACP DISCUSS/DSCN MKR DOCD: CPT | Performed by: FAMILY MEDICINE

## 2025-01-15 PROCEDURE — 1170F FXNL STATUS ASSESSED: CPT | Performed by: FAMILY MEDICINE

## 2025-01-15 PROCEDURE — 3078F DIAST BP <80 MM HG: CPT | Performed by: FAMILY MEDICINE

## 2025-01-15 PROCEDURE — 99214 OFFICE O/P EST MOD 30 MIN: CPT | Performed by: FAMILY MEDICINE

## 2025-01-15 PROCEDURE — 3074F SYST BP LT 130 MM HG: CPT | Performed by: FAMILY MEDICINE

## 2025-01-15 PROCEDURE — G0439 PPPS, SUBSEQ VISIT: HCPCS | Performed by: FAMILY MEDICINE

## 2025-01-15 PROCEDURE — 1159F MED LIST DOCD IN RCRD: CPT | Performed by: FAMILY MEDICINE

## 2025-01-15 PROCEDURE — 1157F ADVNC CARE PLAN IN RCRD: CPT | Performed by: FAMILY MEDICINE

## 2025-01-15 PROCEDURE — 1160F RVW MEDS BY RX/DR IN RCRD: CPT | Performed by: FAMILY MEDICINE

## 2025-01-15 PROCEDURE — 1158F ADVNC CARE PLAN TLK DOCD: CPT | Performed by: FAMILY MEDICINE

## 2025-01-15 ASSESSMENT — ENCOUNTER SYMPTOMS
BACK PAIN: 1
COUGH: 0
FEVER: 0
HEADACHES: 0

## 2025-01-15 ASSESSMENT — ACTIVITIES OF DAILY LIVING (ADL)
DOING_HOUSEWORK: INDEPENDENT
BATHING: INDEPENDENT
TAKING_MEDICATION: INDEPENDENT
MANAGING_FINANCES: INDEPENDENT
DRESSING: INDEPENDENT
GROCERY_SHOPPING: INDEPENDENT

## 2025-01-15 ASSESSMENT — PATIENT HEALTH QUESTIONNAIRE - PHQ9
SUM OF ALL RESPONSES TO PHQ9 QUESTIONS 1 AND 2: 0
2. FEELING DOWN, DEPRESSED OR HOPELESS: NOT AT ALL
1. LITTLE INTEREST OR PLEASURE IN DOING THINGS: NOT AT ALL

## 2025-01-15 NOTE — PROGRESS NOTES
Subjective   Patient ID: Florentino Mccall is a 69 y.o. male who presents for Annual Exam (PHQ2-Negative /Fall Screen- Negative /Advanced care plan - Active ).    HPI     Here today for follow-up and annual wellness visit    Hypertension: Currently takes amlodipine 5 mg twice daily and lisinopril 20 mg twice daily.  He previously was not able to tolerate hydrochlorothiazide and chlorthalidone due to dizziness  He checks his blood pressure out of the office.  Will often fluctuate but usually is 140/80  He is taking pravastatin 10 mg daily and ezetimibe 10 mg daily for hyperlipidemia.  He previously could not tolerate atorvastatin or rosuvastatin due to muscle cramping.  Last LDL checked 7/15/2024 was 104  He has been taking meloxicam as needed for chronic low back pain  Follows with urology due to history of bladder cancer which was diagnosed in 2017.  This was treated with surgery.  They are monitoring his PSA regularly  Last colonoscopy was done 2022.  Repeat 5 years recommended  He is up-to-date on tetanus, pneumococcal and shingles vaccines  He is also following with a specialist for ED    Patient Health Questionnaire-2 Score: 0 (1/15/2025 10:30 AM)           Review of Systems   Constitutional:  Negative for fever.   Respiratory:  Negative for cough.    Cardiovascular:  Positive for leg swelling. Negative for chest pain.   Musculoskeletal:  Positive for back pain.   Neurological:  Negative for headaches.   All other systems reviewed and are negative.      Objective   /75   Pulse 62   Temp 36.4 °C (97.5 °F) (Temporal)   Wt 114 kg (251 lb)   SpO2 96%   BMI 34.04 kg/m²     Physical Exam  Vitals reviewed.   Constitutional:       General: He is not in acute distress.     Appearance: Normal appearance. He is well-developed.   HENT:      Head: Normocephalic.      Right Ear: Tympanic membrane, ear canal and external ear normal.      Left Ear: Tympanic membrane, ear canal and external ear normal.      Nose: Nose  normal.      Mouth/Throat:      Mouth: Mucous membranes are moist.   Eyes:      Conjunctiva/sclera: Conjunctivae normal.   Neck:      Thyroid: No thyromegaly.      Vascular: No JVD.   Cardiovascular:      Rate and Rhythm: Normal rate and regular rhythm.      Heart sounds: Normal heart sounds.   Pulmonary:      Effort: Pulmonary effort is normal.      Breath sounds: Normal breath sounds.   Musculoskeletal:      Right lower leg: Edema present.      Left lower leg: Edema present.      Comments: Trace bilateral leg edema   Lymphadenopathy:      Cervical: No cervical adenopathy.   Skin:     Findings: No rash.   Neurological:      Mental Status: He is alert and oriented to person, place, and time.   Psychiatric:         Mood and Affect: Mood normal.         Behavior: Behavior normal.         Assessment/Plan   Assessment & Plan  Routine general medical examination at health care facility    Orders:    1 Year Follow Up In [facility] - PCP - Wellness Exam; Future    Hyperlipidemia, unspecified hyperlipidemia type    Orders:    CBC; Future    Comprehensive Metabolic Panel; Future    Lipid Panel; Future    TSH with reflex to Free T4 if abnormal; Future    Essential hypertension         Malignant neoplasm of urinary bladder, unspecified site (Multi)  Stable based on symptoms and exam.  Continue established treatment plan and follow-up at least yearly  Continue to follow with urology    Orders:    CBC; Future    Comprehensive Metabolic Panel; Future    Lipid Panel; Future    TSH with reflex to Free T4 if abnormal; Future    Preventative health:  Healthy diet and regular exercise recommended  Up-to-date on screening colonoscopy.  Get screening PSA checked through urology  Declines flu vaccine today.  Up-to-date on tetanus, pneumococcal and shingles vaccines  Hypertension: He has been getting elevated readings out of the office, however today his blood pressure is well-controlled at 118/75.  Will continue amlodipine and  lisinopril at current dose and continue to monitor.  Follow-up in 6 months for recheck  Hyperlipidemia: This is stable and last LDL was 104.  We will continue pravastatin and ezetimibe and follow-up in 6 months.  Ordered labs including lipid panel for him to get done 1 to 2 weeks prior to follow-up appointment

## 2025-01-15 NOTE — ASSESSMENT & PLAN NOTE
Stable based on symptoms and exam.  Continue established treatment plan and follow-up at least yearly  Continue to follow with urology    Orders:    CBC; Future    Comprehensive Metabolic Panel; Future    Lipid Panel; Future    TSH with reflex to Free T4 if abnormal; Future

## 2025-01-17 ENCOUNTER — HOSPITAL ENCOUNTER (OUTPATIENT)
Dept: CARDIOLOGY | Facility: HOSPITAL | Age: 70
Discharge: HOME | End: 2025-01-17
Payer: MEDICARE

## 2025-01-17 DIAGNOSIS — R55 SYNCOPE AND COLLAPSE: ICD-10-CM

## 2025-01-17 DIAGNOSIS — Z95.818 STATUS POST PLACEMENT OF IMPLANTABLE LOOP RECORDER: ICD-10-CM

## 2025-01-23 ENCOUNTER — APPOINTMENT (OUTPATIENT)
Dept: UROLOGY | Facility: CLINIC | Age: 70
End: 2025-01-23
Payer: MEDICARE

## 2025-01-23 VITALS — SYSTOLIC BLOOD PRESSURE: 149 MMHG | DIASTOLIC BLOOD PRESSURE: 86 MMHG | HEART RATE: 68 BPM

## 2025-01-23 DIAGNOSIS — N52.9 ERECTILE DYSFUNCTION, UNSPECIFIED ERECTILE DYSFUNCTION TYPE: Primary | ICD-10-CM

## 2025-01-23 PROCEDURE — 1157F ADVNC CARE PLAN IN RCRD: CPT | Performed by: NURSE PRACTITIONER

## 2025-01-23 PROCEDURE — 99213 OFFICE O/P EST LOW 20 MIN: CPT | Performed by: NURSE PRACTITIONER

## 2025-01-23 PROCEDURE — 3079F DIAST BP 80-89 MM HG: CPT | Performed by: NURSE PRACTITIONER

## 2025-01-23 PROCEDURE — 1159F MED LIST DOCD IN RCRD: CPT | Performed by: NURSE PRACTITIONER

## 2025-01-23 PROCEDURE — 3077F SYST BP >= 140 MM HG: CPT | Performed by: NURSE PRACTITIONER

## 2025-01-23 NOTE — PROGRESS NOTES
Subjective   Patient ID: Florentino Mccall is a 69 y.o. male who presents for Trimix Injection Teaching .  History of severe ED. Previously seen via telehealth by Dr. Rubio.      On Viagra 100 mg for ED for about 15 years, started with lower dose but gradually increased overtime. Medication not effective since the past 3-4 years, also notes some hypotension after usage. Would like to avoid future systemic meds.    Presents today with trimix 10/30/1 for teaching with his wife.         Review of Systems   All other systems reviewed and are negative.      Objective   Physical Exam  Vitals reviewed.   Genitourinary:     Penis: Normal and circumcised.      Alert and oriented x3  Moist mucous membranes  Breathes easily on room air  Abdomen soft, nondistended. Obese  No edema  No scleral icterus  No focal neurological deficits  Appears stated age, no acute distress      Assessment/Plan   Diagnoses and all orders for this visit:  Erectile dysfunction, unspecified erectile dysfunction type    Reviewed trimix teaching. Will adjust accordingly. Plan for 3 month follow up.          Jazmine Rincon, TIFFANIE-CNP 01/23/25 4:18 PM

## 2025-01-23 NOTE — PATIENT INSTRUCTIONS
INJECTION TEACHING VISIT  Patient was able to verbalize how to draw up and administer trimix 20 units safely  He was able to identify correct locations to inject  He is aware of proper titration of drug to achieve erection with the ability to penetrate that lasts approximately one hour long. He is aware to increase the drug by 5-10 units with each injection until this is achieved. Max dosage 60 units  He is aware of possible side effects- including priapism. He may use Sudafed OTC if erection lasts longer than an hour and apply ice to the injection site  He is aware not to inject more often than every other day  He is aware that the medication must be kept cold in order to remain potent  If the erection lasts longer than 3-4 hours he will need to go to ER for management    Plan for f/u in 3 months. Patient verbalized understanding of plan.

## 2025-01-31 ENCOUNTER — HOSPITAL ENCOUNTER (OUTPATIENT)
Dept: CARDIOLOGY | Facility: HOSPITAL | Age: 70
Discharge: HOME | End: 2025-01-31
Payer: MEDICARE

## 2025-01-31 DIAGNOSIS — R55 SYNCOPE AND COLLAPSE: ICD-10-CM

## 2025-01-31 DIAGNOSIS — Z95.818 STATUS POST PLACEMENT OF IMPLANTABLE LOOP RECORDER: ICD-10-CM

## 2025-01-31 PROCEDURE — 93298 REM INTERROG DEV EVAL SCRMS: CPT | Performed by: INTERNAL MEDICINE

## 2025-01-31 PROCEDURE — 93298 REM INTERROG DEV EVAL SCRMS: CPT

## 2025-02-06 DIAGNOSIS — I10 ESSENTIAL HYPERTENSION: ICD-10-CM

## 2025-02-06 DIAGNOSIS — K44.9 HIATAL HERNIA: ICD-10-CM

## 2025-02-06 DIAGNOSIS — E78.5 HYPERLIPIDEMIA, UNSPECIFIED HYPERLIPIDEMIA TYPE: ICD-10-CM

## 2025-02-06 RX ORDER — PRAVASTATIN SODIUM 10 MG/1
10 TABLET ORAL NIGHTLY
Qty: 90 TABLET | Refills: 3 | Status: SHIPPED | OUTPATIENT
Start: 2025-02-06

## 2025-02-06 RX ORDER — AMLODIPINE BESYLATE 5 MG/1
5 TABLET ORAL 2 TIMES DAILY
Qty: 180 TABLET | Refills: 2 | Status: SHIPPED | OUTPATIENT
Start: 2025-02-06

## 2025-02-06 RX ORDER — OMEPRAZOLE 20 MG/1
20 CAPSULE, DELAYED RELEASE ORAL
Qty: 90 CAPSULE | Refills: 2 | Status: SHIPPED | OUTPATIENT
Start: 2025-02-06

## 2025-02-19 ENCOUNTER — APPOINTMENT (OUTPATIENT)
Dept: CARDIOLOGY | Facility: CLINIC | Age: 70
End: 2025-02-19
Payer: MEDICARE

## 2025-02-19 ENCOUNTER — HOSPITAL ENCOUNTER (OUTPATIENT)
Dept: CARDIOLOGY | Facility: HOSPITAL | Age: 70
Discharge: HOME | End: 2025-02-19
Payer: MEDICARE

## 2025-02-19 VITALS
HEIGHT: 72 IN | WEIGHT: 249 LBS | HEART RATE: 60 BPM | BODY MASS INDEX: 33.72 KG/M2 | SYSTOLIC BLOOD PRESSURE: 130 MMHG | DIASTOLIC BLOOD PRESSURE: 86 MMHG

## 2025-02-19 DIAGNOSIS — Z87.891 FORMER SMOKER: ICD-10-CM

## 2025-02-19 DIAGNOSIS — R55 SYNCOPE AND COLLAPSE: ICD-10-CM

## 2025-02-19 DIAGNOSIS — Z95.818 STATUS POST PLACEMENT OF IMPLANTABLE LOOP RECORDER: Primary | ICD-10-CM

## 2025-02-19 DIAGNOSIS — Z95.818 STATUS POST PLACEMENT OF IMPLANTABLE LOOP RECORDER: ICD-10-CM

## 2025-02-19 DIAGNOSIS — I10 ESSENTIAL HYPERTENSION: ICD-10-CM

## 2025-02-19 DIAGNOSIS — Z95.818 PRESENCE OF OTHER CARDIAC IMPLANTS AND GRAFTS: ICD-10-CM

## 2025-02-19 PROCEDURE — 93000 ELECTROCARDIOGRAM COMPLETE: CPT | Mod: DISTINCT PROCEDURAL SERVICE | Performed by: INTERNAL MEDICINE

## 2025-02-19 PROCEDURE — 3075F SYST BP GE 130 - 139MM HG: CPT | Performed by: INTERNAL MEDICINE

## 2025-02-19 PROCEDURE — 1159F MED LIST DOCD IN RCRD: CPT | Performed by: INTERNAL MEDICINE

## 2025-02-19 PROCEDURE — 99214 OFFICE O/P EST MOD 30 MIN: CPT | Performed by: INTERNAL MEDICINE

## 2025-02-19 PROCEDURE — 93291 INTERROG DEV EVAL SCRMS IP: CPT

## 2025-02-19 PROCEDURE — 1036F TOBACCO NON-USER: CPT | Performed by: INTERNAL MEDICINE

## 2025-02-19 PROCEDURE — 1157F ADVNC CARE PLAN IN RCRD: CPT | Performed by: INTERNAL MEDICINE

## 2025-02-19 PROCEDURE — 3008F BODY MASS INDEX DOCD: CPT | Performed by: INTERNAL MEDICINE

## 2025-02-19 PROCEDURE — 93291 INTERROG DEV EVAL SCRMS IP: CPT | Performed by: INTERNAL MEDICINE

## 2025-02-19 PROCEDURE — 3079F DIAST BP 80-89 MM HG: CPT | Performed by: INTERNAL MEDICINE

## 2025-02-19 ASSESSMENT — ENCOUNTER SYMPTOMS
WHEEZING: 0
NEAR-SYNCOPE: 0
CARDIOVASCULAR NEGATIVE: 1
CLAUDICATION: 0
IRREGULAR HEARTBEAT: 0
SYNCOPE: 0
PND: 0
COUGH: 0
SHORTNESS OF BREATH: 0
ORTHOPNEA: 0
SNORING: 0

## 2025-02-19 NOTE — PROGRESS NOTES
CARDIOLOGY OFFICE VISIT      CHIEF COMPLAINT  Chief Complaint   Patient presents with    Follow-up     Pt is here today following up after 6 months with device check        HISTORY OF PRESENT ILLNESS  HPI  69-year-old  male who experienced a aziza syncopal episode in April, 2022 while in a restaurant.  He was evaluated in the emergency room and noted to have a normal left ventricular ejection fraction at 60 to 65% with mild aortic regurgitation.  Diastolic dysfunction was also noted.  Carotid ultrasound was negative.  Moderate plaque was noted in the LAD per CT scan.  Patient is followed for hypertension.  He reports that he always feels better when his blood pressure slightly higher.  He is on combination therapy consisting of amlodipine and lisinopril.  He underwent placement of a loop recorder on April 12, 2022 for evaluation of arrhythmia and correlation to symptom of dizziness and lightheadedness.  He presents to the office today for follow-up evaluation.      Loop recorder interrogations dated August 2, 2024, June 20, 2024 May 24, 2024 revealed no arrhythmic events or evidence of heart block or significant pauses. Battery status is good. 2D echocardiogram dated February 27, 2024 reveals an ejection fraction of 60%, moderate concentric LVH, trace MR, mild TR, mild AR, moderate dilation of the ascending aorta and a right ventricular systolic pressure 35.7 mmHg.    Since the last week's visit he has been doing well.  Denies any symptoms of chest pain or shortness breath or palpitations.    Patient states that his blood pressure has been very well controlled most of the times.  Apparently he has been complaining of some edema in the lower extremities due to Norvasc but primary care physician gave him hydrochlorothiazide and chlorthalidone he had significant side effects from these medications.    Device interrogation has not seen any significant pauses or tacky or bradycardia arrhythmias.    EKG performed  today shows sinus rhythm with first-degree AV block at a rate of 60 bpm rotation 94 ms QT corrected 402 ms.  Rhythm strip shows the same pattern.            Past Medical History  Past Medical History:   Diagnosis Date    Personal history of other diseases of the musculoskeletal system and connective tissue     History of osteopenia    Personal history of other diseases of the musculoskeletal system and connective tissue     History of osteoarthritis       Social History  Social History     Tobacco Use    Smoking status: Former     Current packs/day: 0.00     Average packs/day: 1 pack/day for 45.0 years (45.0 ttl pk-yrs)     Types: Cigarettes     Start date:      Quit date:      Years since quittin.1    Smokeless tobacco: Never   Substance Use Topics    Alcohol use: Not Currently    Drug use: Not Currently       Family History     Family History   Problem Relation Name Age of Onset    Other (cardiac arrhythmia) Mother      Coronary artery disease Mother      Other (aortic root) Daughter      Other (enlarged aorta) Daughter          Allergies:  Allergies   Allergen Reactions    Sulindac Anaphylaxis     Takes meloxicam without problem        Outpatient Medications:  Current Outpatient Medications   Medication Instructions    albuterol 90 mcg/actuation inhaler 2 puffs, Every 4 hours PRN    alfuzosin (UROXATRAL) 10 mg, oral, Daily, Do not crush, chew, or split.    amLODIPine (NORVASC) 5 mg, oral, 2 times daily    aspirin 81 mg EC tablet Take by mouth.    ezetimibe (ZETIA) 10 mg, oral, Daily    lisinopril 40 mg, oral, Daily    meloxicam (MOBIC) 15 mg, oral, Daily    omeprazole (PRILOSEC) 20 mg, oral, Daily before breakfast    pravastatin (PRAVACHOL) 10 mg, oral, Nightly    sodium chloride 5 % ophthalmic ointment daily   at bedtime   os          REVIEW OF SYSTEMS  Review of Systems   Constitutional: Negative for malaise/fatigue.   Cardiovascular: Negative.  Negative for claudication, cyanosis, irregular  heartbeat, leg swelling, near-syncope, orthopnea, paroxysmal nocturnal dyspnea and syncope.   Respiratory:  Negative for cough, shortness of breath, snoring and wheezing.    All other systems reviewed and are negative.        VITALS  Vitals:    02/19/25 0823   BP: 130/86   Pulse: 60       PHYSICAL EXAM  Constitutional:       Appearance: Normal and healthy appearance. Well-developed and not in distress. Obese.      Comments: Left sided loop recorder site well healed   Neck:      Vascular: No JVR. JVD normal.   Pulmonary:      Effort: Pulmonary effort is normal.      Breath sounds: Normal breath sounds. No wheezing. No rhonchi. No rales.   Chest:      Chest wall: Not tender to palpatation.   Cardiovascular:      PMI at left midclavicular line. Normal rate. Regular rhythm. Normal S1. Normal S2.       Murmurs: There is no murmur.      No gallop.  No click. No rub.   Pulses:     Intact distal pulses.   Edema:     Peripheral edema absent.   Abdominal:      Tenderness: There is no abdominal tenderness.   Musculoskeletal: Normal range of motion.         General: No tenderness. Skin:     General: Skin is warm and dry.   Neurological:      General: No focal deficit present.      Mental Status: Alert and oriented to person, place and time.           ASSESSMENT AND PLAN  1.  Syncope of unknown etiology status post loop recorder implant (PT [employer] 2) on April 12, 2022 for evaluation of arrhythmia.  2.  Hypertension, blood pressure today 148/80.  3.  Normal left ventricular function per 2D echocardiogram dated February 27, 2024, ejection fraction 60% with moderate concentric LVH.  4.  Valvular heart disease consisting of trace MR, mild TR, mild AR per 2D echocardiogram dated February 27, 2024.  5.  Moderate dilation of the ascending aorta per 2D echocardiogram dated February 27, 2024.  6.  Dyslipidemia on statin.  7.  Mild coronary disease per CTA.  8.  Class I obesity with a BMI 33.50.    Plan recommendation    Will  continue with current medical therapy that includes amlodipine and lisinopril.    Apparently in the December, he complains of significant edema in the lower extremity.  If that happens again we can try low-dose Lasix.    Follow my office in 9 months or sooner if needed    Follow device clinic as scheduled.    Risk factor modification and lifestyle modification discussed with patient. Diet , exercise and hydration discussed with patient.    I have personally review with patient during this office visit, laboratory data, echocardiogram results, stress test results, Holter-event monitor results prior and after the last electrophysiology visit. All questions has been answered.    Please excuse any errors in grammar or translation related to this dictation.  Voice recognition software was utilized to prepare this document.

## 2025-02-19 NOTE — PATIENT INSTRUCTIONS
Follow up in 9 months with Dr. Quiñones, with an in- clinic device check  Remote monthly loop recorder checks  Continue same medications and treatments.   Patient educated on proper medication use.   Patient educated on risk factor modification.   Please bring any lab results from other providers / physicians to your next appointment.     Please bring all medicines, vitamins, and herbal supplements with you when you come to the office.     Prescriptions will not be filled unless you are compliant with your follow up appointments or have a follow up appointment scheduled as per instruction of your physician. Refills should be requested at the time of your visit.  IMELISSA LPN, AM SCRIBING FOR, AND IN THE PRESENCE OF DR. BRIANNA QUIÑONES MD

## 2025-03-06 DIAGNOSIS — M54.50 LOW BACK PAIN, UNSPECIFIED BACK PAIN LATERALITY, UNSPECIFIED CHRONICITY, UNSPECIFIED WHETHER SCIATICA PRESENT: ICD-10-CM

## 2025-03-07 RX ORDER — MELOXICAM 15 MG/1
15 TABLET ORAL DAILY
Qty: 30 TABLET | Refills: 2 | Status: SHIPPED | OUTPATIENT
Start: 2025-03-07

## 2025-03-28 ENCOUNTER — HOSPITAL ENCOUNTER (OUTPATIENT)
Dept: CARDIOLOGY | Facility: HOSPITAL | Age: 70
Discharge: HOME | End: 2025-03-28
Payer: MEDICARE

## 2025-03-28 DIAGNOSIS — Z95.818 STATUS POST PLACEMENT OF IMPLANTABLE LOOP RECORDER: ICD-10-CM

## 2025-03-28 DIAGNOSIS — R55 SYNCOPE AND COLLAPSE: ICD-10-CM

## 2025-03-28 PROCEDURE — 93298 REM INTERROG DEV EVAL SCRMS: CPT | Performed by: INTERNAL MEDICINE

## 2025-03-28 PROCEDURE — 93298 REM INTERROG DEV EVAL SCRMS: CPT

## 2025-04-25 ENCOUNTER — APPOINTMENT (OUTPATIENT)
Dept: UROLOGY | Facility: CLINIC | Age: 70
End: 2025-04-25
Payer: MEDICARE

## 2025-04-25 VITALS — SYSTOLIC BLOOD PRESSURE: 123 MMHG | HEART RATE: 77 BPM | TEMPERATURE: 98 F | DIASTOLIC BLOOD PRESSURE: 69 MMHG

## 2025-04-25 DIAGNOSIS — N52.9 ERECTILE DYSFUNCTION, UNSPECIFIED ERECTILE DYSFUNCTION TYPE: Primary | ICD-10-CM

## 2025-04-25 PROCEDURE — 3078F DIAST BP <80 MM HG: CPT | Performed by: NURSE PRACTITIONER

## 2025-04-25 PROCEDURE — 1159F MED LIST DOCD IN RCRD: CPT | Performed by: NURSE PRACTITIONER

## 2025-04-25 PROCEDURE — 3074F SYST BP LT 130 MM HG: CPT | Performed by: NURSE PRACTITIONER

## 2025-04-25 PROCEDURE — 99213 OFFICE O/P EST LOW 20 MIN: CPT | Performed by: NURSE PRACTITIONER

## 2025-04-25 PROCEDURE — 1157F ADVNC CARE PLAN IN RCRD: CPT | Performed by: NURSE PRACTITIONER

## 2025-04-25 NOTE — PROGRESS NOTES
Subjective   Patient ID: Florentino Mccall is a 69 y.o. male who presents for Follow-up.  History of severe ED. Previously seen via telehealth by Dr. Rubio.      On Viagra 100 mg for ED for about 15 years, started with lower dose but gradually increased overtime. Medication not effective since the past 3-4 years, also notes some hypotension after usage. Would like to avoid future systemic meds.     Using 10/30/1 up to 20 units with good response. Pleased with response. Denies difficulty with usage. Erection lasts approximately 1 hour and resolves without difficulty.                 Review of Systems   All other systems reviewed and are negative.      Objective   Physical Exam  Vitals reviewed.     Alert and oriented x3  Moist mucous membranes  Breathes easily on room air  Abdomen soft, nondistended. Obese  No edema  No scleral icterus  No focal neurological deficits  Appears stated age, no acute distress      Assessment/Plan   Diagnoses and all orders for this visit:  Erectile dysfunction, unspecified erectile dysfunction type    Plan to continue with trimix. Very pleased with results. Will continue indefinitely. Plan for annual follow up.        Jazmine Rincon, TIFFANIE-CNP 04/25/25 3:40 PM

## 2025-05-02 ENCOUNTER — HOSPITAL ENCOUNTER (OUTPATIENT)
Dept: CARDIOLOGY | Facility: HOSPITAL | Age: 70
Discharge: HOME | End: 2025-05-02
Payer: MEDICARE

## 2025-05-02 DIAGNOSIS — Z95.818 STATUS POST PLACEMENT OF IMPLANTABLE LOOP RECORDER: ICD-10-CM

## 2025-05-02 DIAGNOSIS — R55 SYNCOPE AND COLLAPSE: ICD-10-CM

## 2025-05-02 PROCEDURE — 93298 REM INTERROG DEV EVAL SCRMS: CPT

## 2025-05-12 ENCOUNTER — OFFICE VISIT (OUTPATIENT)
Dept: PAIN MEDICINE | Facility: CLINIC | Age: 70
End: 2025-05-12
Payer: MEDICARE

## 2025-05-12 ENCOUNTER — HOSPITAL ENCOUNTER (OUTPATIENT)
Dept: RADIOLOGY | Facility: HOSPITAL | Age: 70
Discharge: HOME | End: 2025-05-12
Payer: MEDICARE

## 2025-05-12 ENCOUNTER — OFFICE VISIT (OUTPATIENT)
Dept: ORTHOPEDIC SURGERY | Facility: CLINIC | Age: 70
End: 2025-05-12
Payer: MEDICARE

## 2025-05-12 DIAGNOSIS — M51.26 DISPLACEMENT OF LUMBAR INTERVERTEBRAL DISC WITHOUT MYELOPATHY: ICD-10-CM

## 2025-05-12 DIAGNOSIS — M17.12 PRIMARY OSTEOARTHRITIS OF LEFT KNEE: Primary | ICD-10-CM

## 2025-05-12 DIAGNOSIS — M51.26 DISPLACEMENT OF LUMBAR INTERVERTEBRAL DISC WITHOUT MYELOPATHY: Primary | ICD-10-CM

## 2025-05-12 PROCEDURE — 72100 X-RAY EXAM L-S SPINE 2/3 VWS: CPT

## 2025-05-12 PROCEDURE — 99213 OFFICE O/P EST LOW 20 MIN: CPT | Performed by: ORTHOPAEDIC SURGERY

## 2025-05-12 PROCEDURE — 1159F MED LIST DOCD IN RCRD: CPT | Performed by: PAIN MEDICINE

## 2025-05-12 PROCEDURE — 99214 OFFICE O/P EST MOD 30 MIN: CPT | Performed by: PAIN MEDICINE

## 2025-05-12 PROCEDURE — G2211 COMPLEX E/M VISIT ADD ON: HCPCS | Performed by: PAIN MEDICINE

## 2025-05-12 PROCEDURE — 72100 X-RAY EXAM L-S SPINE 2/3 VWS: CPT | Performed by: RADIOLOGY

## 2025-05-12 PROCEDURE — 1125F AMNT PAIN NOTED PAIN PRSNT: CPT | Performed by: PAIN MEDICINE

## 2025-05-12 PROCEDURE — 2500000004 HC RX 250 GENERAL PHARMACY W/ HCPCS (ALT 636 FOR OP/ED): Performed by: ORTHOPAEDIC SURGERY

## 2025-05-12 PROCEDURE — 20610 DRAIN/INJ JOINT/BURSA W/O US: CPT | Mod: LT | Performed by: ORTHOPAEDIC SURGERY

## 2025-05-12 RX ORDER — LIDOCAINE HYDROCHLORIDE 10 MG/ML
5 INJECTION, SOLUTION INFILTRATION; PERINEURAL
Status: DISCONTINUED | OUTPATIENT
Start: 2025-05-12 | End: 2025-05-12 | Stop reason: HOSPADM

## 2025-05-12 RX ORDER — BETAMETHASONE SODIUM PHOSPHATE AND BETAMETHASONE ACETATE 3; 3 MG/ML; MG/ML
12 INJECTION, SUSPENSION INTRA-ARTICULAR; INTRALESIONAL; INTRAMUSCULAR; SOFT TISSUE
Status: DISCONTINUED | OUTPATIENT
Start: 2025-05-12 | End: 2025-05-12 | Stop reason: HOSPADM

## 2025-05-12 RX ADMIN — LIDOCAINE HYDROCHLORIDE 5 ML: 10 INJECTION, SOLUTION INFILTRATION; PERINEURAL at 10:16

## 2025-05-12 RX ADMIN — BETAMETHASONE ACETATE AND BETAMETHASONE SODIUM PHOSPHATE 12 MG: 3; 3 INJECTION, SUSPENSION INTRA-ARTICULAR; INTRALESIONAL; INTRAMUSCULAR; SOFT TISSUE at 10:16

## 2025-05-12 ASSESSMENT — PAIN DESCRIPTION - DESCRIPTORS: DESCRIPTORS: ACHING

## 2025-05-12 ASSESSMENT — PAIN SCALES - GENERAL: PAINLEVEL_OUTOF10: 6

## 2025-05-12 ASSESSMENT — PAIN - FUNCTIONAL ASSESSMENT: PAIN_FUNCTIONAL_ASSESSMENT: 0-10

## 2025-05-12 NOTE — PROGRESS NOTES
Subjective   Florentino Mccall is a 69 y.o. male who presents for evaluation of his back pain.  The pain is located in the lumbar area. Here to discuss injections options with the doctor. Currently has 6/10 pain at this time.  I have reviewed the nurses notes and am aware of family/social history.       Review of Systems    Objective   Physical Exam    ASSESSMENT:     PLAN:   Discussed treatment plan with patient.   Call or return to clinic prn if these symptoms worsen or fail to improve as anticipated.     Darshana Agee RN

## 2025-05-12 NOTE — H&P
History Of Present Illness  Florentino Mccall is a 69 y.o. male presenting with chronic low back pain he is known to me from prior encounter he did receive a lumbar epidural steroid injection back in July 2023 confirming positive response rated at 85% currently he started having reoccurrence of the symptoms describing the pain around the lower back area and numbness going around his right thigh and he is also having a right hip joint that he was advised it would require some intervention.  Pain is aggravated during standing and walking.  Rating the pain at a level of 7 out of 10 and did not have any recent images for his lumbar spine area.  He has finished up the physical therapy exercises and continues to do them on his own at home but he does not believe they have been helping.  Continues to be on the meloxicam 15 mg once per day with some mild improvement.  Revised Oswestry low back disability questionnaire was filled today and graded at 22%  Past Medical History  Medical History[1]  Surgical History  Surgical History[2]  Social History  He reports that he quit smoking about 20 years ago. His smoking use included cigarettes. He started smoking about 65 years ago. He has a 45 pack-year smoking history. He has never used smokeless tobacco. He reports that he does not currently use alcohol. He reports that he does not currently use drugs.    Family History  Family History[3]     Allergies  Allergies[4]  Review of Systems   All 13 systems were reviewed and are within normal levels except as noted below or per HPI. Positive and pertinent negative responses are noted below or in the HPI   Denied any fever or chills. No weight loss and no night sweats. No cough or sputum production. No diarrhea   No constipation  No bladder and bowel incontinence and no other changes in bladder and bowel. No skin changes.   Denied opioids diversion and abuse and denies alcoholism. Denies overuse of  pain medications.   Physical Exam       Past  medical history no interval changes has been noted    On physical examination    General   Alert, oriented x3 pleasant and cooperative. Does not look in any major distress.    HEENT  Pupils normal in size. Ears, nose, mouth, and throat appear to be in normal condition.  Head atraumatic      No signs of sedation or signs of withdrawal apparent.    Psychiatric   No signs of depression apparent.    Neuro   No focal neurological deficit apparent. Ambulation at baseline.  Reflexes in the lower extremities absent    Respiratory  No respiratory distress     Abdomen  no distention     Skin  No skin markings supportive of recent IV drug usage .    Cardiovascular  Regular rate and rhythm    Last Recorded Vitals  There were no vitals taken for this visit.    Assessment/Plan   69 years old with history and physical examination supportive of chronic back pain with lumbar disc displacement tried and failed conservative management    Plan  Advised the patient that I would recommend to obtain an x-ray and MRI of the lumbar spine for presurgical planning and to see if there is anything I could offer him from an interventional standpoint in the meantime I would recommend repeating the lumbar epidural steroid injection to be performed under fluoroscopic guidance targeting the L5-S1 level with the injection of contrast material under fluoroscopic guidance I will reevaluate the patient after the performance of the injection and the imaging study for further recommendation as his case progressed patient verbalized understanding and agreement with the plan after explaining the benefits and the risk      The above clinical summary has been dictated with voice recognition software. It has not been proofread for grammatical errors, typographical mistakes, or other semantic inconsistencies.    Thank you for visiting our office today. It was our pleasure to take part in your healthcare.     Please do not hesitate to contact the pain clinic  after your visit with any questions or concerns at  M-F 8-4 pm       Jyoti Joshua M.D.  Medical Director , Division of Pain Medicine [facility]   of Anesthesiology and Pain Medicine  [facility] [employer]     [facility]  [address] [facility]  [address] [place] [id number]     Office: [phone]  Fax: [phone]      Jyoti Joshua MD       [1]   Past Medical History:  Diagnosis Date    Personal history of other diseases of the musculoskeletal system and connective tissue     History of osteopenia    Personal history of other diseases of the musculoskeletal system and connective tissue     History of osteoarthritis   [2]   Past Surgical History:  Procedure Laterality Date    CATARACT EXTRACTION  06/30/2017    Cataract Surgery    COLONOSCOPY  06/30/2017    Complete Colonoscopy    KNEE ARTHROSCOPY W/ DEBRIDEMENT  07/01/2017    Knee Arthroscopy (Therapeutic)    OTHER SURGICAL HISTORY  11/09/2021    Bladder surgery    OTHER SURGICAL HISTORY  11/09/2021    Retinal detachment repair    OTHER SURGICAL HISTORY  11/09/2021    Transurethral resection of bladder tumor    OTHER SURGICAL HISTORY  11/09/2021    Cataract surgery    RETINAL DETACHMENT SURGERY  07/01/2017    Repair Of Retinal Detachment    ROTATOR CUFF REPAIR  07/01/2017    Rotator Cuff Repair    VASECTOMY  07/01/2017    Surgery Vas Deferens Vasectomy   [3]   Family History  Problem Relation Name Age of Onset    Other (cardiac arrhythmia) Mother      Coronary artery disease Mother      Other (aortic root) Daughter      Other (enlarged aorta) Daughter     [4]   Allergies  Allergen Reactions    Sulindac Anaphylaxis     Takes meloxicam without problem

## 2025-05-12 NOTE — PROGRESS NOTES
History: Florentino is here for his left  knee. He has a known history of left knee DJD.  We did an injection 2 and half years ago which worked fairly well.  He also takes meloxicam daily.    Past medical history: Multiple  Medications: Multiple  Allergies: No known drug allergies    Please refer to the intake H&P regarding the patient's review of systems, family history and social history as was done today    HEENT: Normal  Lungs: Clear to auscultation  Heart: RRR  Abdomen: Soft, nontender  Skin: clear  Extremity: Tenderness more medially.  1+ crepitus with range of motion.  Negative Lachman and posterior drawer.  Mild laxity with varus stress.  No numbness or tingling.  Contralateral exam is normal for strength, motion, stability and neurovascular assessment.    Radiographs: Previous x-rays show bone-on-bone articulation medially with varus angulation.    Assessment: Moderate to severe left knee DJD with varus angulation    Plan: He has done well with cortisone injections in the past.  He also takes his daily Mobic and will follow-up with his PCP for routine blood work.  He would like to try another cortisone injection today.  We did discuss viscosupplementation and he can call to proceed in the future if desired.    L Inj/Asp: L knee on 5/12/2025 10:16 AM  Indications: pain  Details: 22 G needle, lateral approach  Medications: 5 mL lidocaine 10 mg/mL (1 %); 12 mg betamethasone acet,sod phos 6 mg/mL  Outcome: tolerated well, no immediate complications  Procedure, treatment alternatives, risks and benefits explained, specific risks discussed. Consent was given by the patient. Immediately prior to procedure a time out was called to verify the correct patient, procedure, equipment, support staff and site/side marked as required. Patient was prepped and draped in the usual sterile fashion.          All questions were answered today with the patient.    Scribe Attestation  By signing my name below, Francisca RIVAS Scribe    attest that this documentation has been prepared under the direction and in the presence of Papi Jimenez MD.

## 2025-05-13 ENCOUNTER — HOSPITAL ENCOUNTER (OUTPATIENT)
Dept: PAIN MEDICINE | Facility: CLINIC | Age: 70
Discharge: HOME | End: 2025-05-13
Payer: MEDICARE

## 2025-05-13 VITALS
OXYGEN SATURATION: 98 % | TEMPERATURE: 97.7 F | HEART RATE: 81 BPM | DIASTOLIC BLOOD PRESSURE: 87 MMHG | SYSTOLIC BLOOD PRESSURE: 166 MMHG | RESPIRATION RATE: 18 BRPM

## 2025-05-13 DIAGNOSIS — M51.26 DISPLACEMENT OF LUMBAR INTERVERTEBRAL DISC WITHOUT MYELOPATHY: ICD-10-CM

## 2025-05-13 PROCEDURE — 62323 NJX INTERLAMINAR LMBR/SAC: CPT | Performed by: PAIN MEDICINE

## 2025-05-13 PROCEDURE — 2550000001 HC RX 255 CONTRASTS: Mod: JZ | Performed by: PAIN MEDICINE

## 2025-05-13 PROCEDURE — 2500000004 HC RX 250 GENERAL PHARMACY W/ HCPCS (ALT 636 FOR OP/ED): Mod: JZ | Performed by: PAIN MEDICINE

## 2025-05-13 RX ORDER — METHYLPREDNISOLONE ACETATE 80 MG/ML
INJECTION, SUSPENSION INTRA-ARTICULAR; INTRALESIONAL; INTRAMUSCULAR; SOFT TISSUE AS NEEDED
Status: COMPLETED | OUTPATIENT
Start: 2025-05-13 | End: 2025-05-13

## 2025-05-13 RX ORDER — BUPIVACAINE HYDROCHLORIDE 2.5 MG/ML
INJECTION, SOLUTION EPIDURAL; INFILTRATION; INTRACAUDAL; PERINEURAL AS NEEDED
Status: COMPLETED | OUTPATIENT
Start: 2025-05-13 | End: 2025-05-13

## 2025-05-13 RX ORDER — LIDOCAINE HYDROCHLORIDE 10 MG/ML
INJECTION, SOLUTION EPIDURAL; INFILTRATION; INTRACAUDAL; PERINEURAL AS NEEDED
Status: COMPLETED | OUTPATIENT
Start: 2025-05-13 | End: 2025-05-13

## 2025-05-13 RX ADMIN — IOHEXOL 10 ML: 300 INJECTION, SOLUTION INTRAVENOUS at 10:21

## 2025-05-13 RX ADMIN — LIDOCAINE HYDROCHLORIDE 2 ML: 10 INJECTION, SOLUTION EPIDURAL; INFILTRATION; INTRACAUDAL; PERINEURAL at 10:18

## 2025-05-13 RX ADMIN — BUPIVACAINE HYDROCHLORIDE 10 ML: 2.5 INJECTION, SOLUTION EPIDURAL; INFILTRATION; INTRACAUDAL; PERINEURAL at 10:22

## 2025-05-13 RX ADMIN — METHYLPREDNISOLONE ACETATE 80 MG: 80 INJECTION, SUSPENSION INTRA-ARTICULAR; INTRALESIONAL; INTRAMUSCULAR; SOFT TISSUE at 10:23

## 2025-05-13 ASSESSMENT — PAIN DESCRIPTION - DESCRIPTORS: DESCRIPTORS: ACHING

## 2025-05-13 ASSESSMENT — PAIN - FUNCTIONAL ASSESSMENT: PAIN_FUNCTIONAL_ASSESSMENT: 0-10

## 2025-05-13 ASSESSMENT — PAIN SCALES - GENERAL: PAINLEVEL_OUTOF10: 6

## 2025-05-13 NOTE — DISCHARGE INSTRUCTIONS
Post-injection instructions:    Your pain may not be gone immediately after the procedure--it usually takes the steroid 3-5 days to start working.   It may take several weeks for the medicine to reach its' full effect.   Pay attention to how much pain relief (what percentage compared to before the procedure) you get and for how long it lasts.     Activity: Avoid strenuous activity for 24 hours. After that return to your normal activity level.     Bandages: Remove after 24 hours     Showering/Bathing: You may shower after bandage is removed     Follow up: CALL OFFICE IN 7 DAYS [phone] LEAVE MESSAGE ABOUT THE RELIEF THAT WAS OBTAINED      Call the doctor immediately: if you notice:     Excessive bleeding from procedure site (brisk bright red bleeding from the site or bleeding that soaks the bandages or does not stop)   Severe headache  Inability to walk, leg or arm weakness or numbness that is worse after the procedure   Uncontrolled pain   New urinary or fecal incontinence   Signs of infection: Fever above 101.5F, redness, swelling, pus or drainage from the site    Epidural Injection    Why is this procedure done?  With an epidural injection, the doctor injects drugs deep into the area around your spinal cord. This is different than epidural anesthesia that is used for surgery or when a woman has a baby. Your spine is a group of bones in your back that protect the nerves in your spinal cord. Problems with your spine can cause swollen nerves in the spinal cord. This swelling leads to pain and can limit movement. In an epidural injection, the doctor may give you a drug to help with swelling and pain.  You may have an epidural injection in different parts of your back, based on where your pain is. For pain in your head or arms, you may have a cervical epidural injection. If your pain is in your upper or middle back, you may get a thoracic epidural injection. For pain in your lower back or legs, you may get a  lumbar epidural injection.    What will the results be?  The treatment may:  Lower pain  Reduce swelling in the nerves  Improve movement  What happens before the procedure?  Your doctor will take your history. Talk to the doctor about:  All the drugs you are taking. Be sure to include all prescription and over-the-counter (OTC) drugs, and herbal supplements. Tell the doctor about any drug allergy. Bring a list of drugs you take with you.  If you have high blood sugar or diabetes. Your drugs may need to be changed.  Any bleeding problems. Be sure to tell your doctor if you are taking any drugs that may cause bleeding. Some of these are warfarin, rivaroxaban, apixaban, ticagrelor, clopidogrel, ketorolac, ibuprofen, naproxen, or aspirin. Certain vitamins and herbs, such as garlic and fish oil, may also add to the risk for bleeding. You may need to stop these drugs as well. Talk to your doctor about them.  Tell the doctor if you are pregnant.  You will not be allowed to drive right away after the procedure. Ask a family member or a friend to drive you home.  What happens during the procedure?  To help the doctor make sure the drugs are being injected in the right place, your doctor may do an x-ray of your spine. Other times your doctor may do a continuous x-ray during the procedure. This is a fluoroscopy. The doctor may also use a colored dye or a contrast dye to check where to inject the drug.  You may be given a drug to help you relax. You may be given a drug to make the area of the injection numb.  The doctor will clean the skin on your back or neck. This helps prevent infection.  The doctor will put a needle through the skin toward your spine. The drug will be injected into a space near the spine.  The needle will be taken out and a bandage will be placed over the injection site.  What happens after the procedure?  Staff will check on you to make sure you are doing well. They will tell you when you can go  home.  What care is needed at home?  Relax on the day of the injection.  Do not drive or run machines for at least 12 hours afterwards.  Apply ice to the injection site. Place an ice pack or a bag of frozen peas wrapped in a towel over the painful part. Never put ice right on the skin. Do not leave the ice on more than 10 to 15 minutes at a time.  It may take a few days before you will feel the effects of the injection.  What follow-up care is needed?  Your doctor may ask you to make visits to the office to check on your progress. Be sure to keep these visits. You may also need to see a physical therapist (PT). The PT will teach you exercises to help you get back your strength and motion. Ask your doctor when you can exercise.  What problems could happen?  Bleeding  Infection (rare)  Headache  Nerve injury  If you have diabetes, your blood sugar can go up after the injection. Check with your doctor if you need more treatment for this.  Last Reviewed Date

## 2025-05-14 ENCOUNTER — TELEPHONE (OUTPATIENT)
Dept: PAIN MEDICINE | Facility: CLINIC | Age: 70
End: 2025-05-14
Payer: MEDICARE

## 2025-05-14 NOTE — TELEPHONE ENCOUNTER
Calls in and leaves message that he is feeling pain free after the epidural  he received immediate relief after the injection

## 2025-05-20 DIAGNOSIS — I10 ESSENTIAL HYPERTENSION: Primary | ICD-10-CM

## 2025-05-20 RX ORDER — LISINOPRIL 20 MG/1
20 TABLET ORAL 2 TIMES DAILY
Qty: 180 TABLET | Refills: 3 | Status: SHIPPED | OUTPATIENT
Start: 2025-05-20

## 2025-05-20 RX ORDER — LISINOPRIL 20 MG/1
20 TABLET ORAL 2 TIMES DAILY
COMMUNITY
End: 2025-05-20 | Stop reason: SDUPTHER

## 2025-06-05 ENCOUNTER — APPOINTMENT (OUTPATIENT)
Dept: RADIOLOGY | Facility: CLINIC | Age: 70
End: 2025-06-05
Payer: MEDICARE

## 2025-06-05 DIAGNOSIS — M51.26 DISPLACEMENT OF LUMBAR INTERVERTEBRAL DISC WITHOUT MYELOPATHY: ICD-10-CM

## 2025-06-05 PROCEDURE — 72148 MRI LUMBAR SPINE W/O DYE: CPT

## 2025-06-06 ENCOUNTER — HOSPITAL ENCOUNTER (OUTPATIENT)
Dept: CARDIOLOGY | Facility: HOSPITAL | Age: 70
Discharge: HOME | End: 2025-06-06
Payer: MEDICARE

## 2025-06-06 DIAGNOSIS — Z95.818 STATUS POST PLACEMENT OF IMPLANTABLE LOOP RECORDER: ICD-10-CM

## 2025-06-06 DIAGNOSIS — R55 SYNCOPE AND COLLAPSE: ICD-10-CM

## 2025-06-06 PROCEDURE — 93298 REM INTERROG DEV EVAL SCRMS: CPT

## 2025-06-21 DIAGNOSIS — M54.50 LOW BACK PAIN, UNSPECIFIED BACK PAIN LATERALITY, UNSPECIFIED CHRONICITY, UNSPECIFIED WHETHER SCIATICA PRESENT: ICD-10-CM

## 2025-06-23 RX ORDER — MELOXICAM 15 MG/1
15 TABLET ORAL DAILY
Qty: 30 TABLET | Refills: 2 | Status: SHIPPED | OUTPATIENT
Start: 2025-06-23

## 2025-07-11 ENCOUNTER — HOSPITAL ENCOUNTER (OUTPATIENT)
Dept: CARDIOLOGY | Facility: HOSPITAL | Age: 70
Discharge: HOME | End: 2025-07-11
Payer: MEDICARE

## 2025-07-11 DIAGNOSIS — Z95.818 STATUS POST PLACEMENT OF IMPLANTABLE LOOP RECORDER: ICD-10-CM

## 2025-07-11 DIAGNOSIS — R55 SYNCOPE AND COLLAPSE: ICD-10-CM

## 2025-07-11 PROCEDURE — 93298 REM INTERROG DEV EVAL SCRMS: CPT

## 2025-07-16 ENCOUNTER — APPOINTMENT (OUTPATIENT)
Dept: PRIMARY CARE | Facility: CLINIC | Age: 70
End: 2025-07-16
Payer: MEDICARE

## 2025-08-13 DIAGNOSIS — E78.5 HYPERLIPIDEMIA, UNSPECIFIED HYPERLIPIDEMIA TYPE: ICD-10-CM

## 2025-08-13 RX ORDER — EZETIMIBE 10 MG/1
10 TABLET ORAL DAILY
Qty: 90 TABLET | Refills: 3 | Status: SHIPPED | OUTPATIENT
Start: 2025-08-13

## 2025-08-14 ENCOUNTER — APPOINTMENT (OUTPATIENT)
Dept: PRIMARY CARE | Facility: CLINIC | Age: 70
End: 2025-08-14
Payer: MEDICARE

## 2025-08-14 VITALS
TEMPERATURE: 97.5 F | BODY MASS INDEX: 33.77 KG/M2 | WEIGHT: 249 LBS | DIASTOLIC BLOOD PRESSURE: 82 MMHG | SYSTOLIC BLOOD PRESSURE: 151 MMHG | OXYGEN SATURATION: 95 % | HEART RATE: 61 BPM

## 2025-08-14 DIAGNOSIS — E78.5 HYPERLIPIDEMIA, UNSPECIFIED HYPERLIPIDEMIA TYPE: Primary | ICD-10-CM

## 2025-08-14 DIAGNOSIS — I10 ESSENTIAL HYPERTENSION: ICD-10-CM

## 2025-08-14 LAB
ALBUMIN SERPL-MCNC: 4.8 G/DL (ref 3.6–5.1)
ALP SERPL-CCNC: 49 U/L (ref 35–144)
ALT SERPL-CCNC: 22 U/L (ref 9–46)
ANION GAP SERPL CALCULATED.4IONS-SCNC: 10 MMOL/L (CALC) (ref 7–17)
AST SERPL-CCNC: 16 U/L (ref 10–35)
BILIRUB SERPL-MCNC: 0.7 MG/DL (ref 0.2–1.2)
BUN SERPL-MCNC: 20 MG/DL (ref 7–25)
CALCIUM SERPL-MCNC: 9.5 MG/DL (ref 8.6–10.3)
CHLORIDE SERPL-SCNC: 107 MMOL/L (ref 98–110)
CHOLEST SERPL-MCNC: 161 MG/DL
CHOLEST/HDLC SERPL: 2.8 (CALC)
CO2 SERPL-SCNC: 23 MMOL/L (ref 20–32)
CREAT SERPL-MCNC: 1.22 MG/DL (ref 0.7–1.35)
EGFRCR SERPLBLD CKD-EPI 2021: 65 ML/MIN/1.73M2
ERYTHROCYTE [DISTWIDTH] IN BLOOD BY AUTOMATED COUNT: 13.1 % (ref 11–15)
GLUCOSE SERPL-MCNC: 88 MG/DL (ref 65–99)
HCT VFR BLD AUTO: 47.9 % (ref 38.5–50)
HDLC SERPL-MCNC: 58 MG/DL
HGB BLD-MCNC: 15.9 G/DL (ref 13.2–17.1)
LDLC SERPL CALC-MCNC: 89 MG/DL (CALC)
MCH RBC QN AUTO: 29 PG (ref 27–33)
MCHC RBC AUTO-ENTMCNC: 33.2 G/DL (ref 32–36)
MCV RBC AUTO: 87.4 FL (ref 80–100)
NONHDLC SERPL-MCNC: 103 MG/DL (CALC)
PLATELET # BLD AUTO: 230 THOUSAND/UL (ref 140–400)
PMV BLD REES-ECKER: 9.6 FL (ref 7.5–12.5)
POTASSIUM SERPL-SCNC: 5 MMOL/L (ref 3.5–5.3)
PROT SERPL-MCNC: 6.8 G/DL (ref 6.1–8.1)
RBC # BLD AUTO: 5.48 MILLION/UL (ref 4.2–5.8)
SODIUM SERPL-SCNC: 140 MMOL/L (ref 135–146)
TRIGL SERPL-MCNC: 58 MG/DL
TSH SERPL-ACNC: 2.29 MIU/L (ref 0.4–4.5)
WBC # BLD AUTO: 5.4 THOUSAND/UL (ref 3.8–10.8)

## 2025-08-14 PROCEDURE — 1159F MED LIST DOCD IN RCRD: CPT | Performed by: FAMILY MEDICINE

## 2025-08-14 PROCEDURE — 1160F RVW MEDS BY RX/DR IN RCRD: CPT | Performed by: FAMILY MEDICINE

## 2025-08-14 PROCEDURE — 3077F SYST BP >= 140 MM HG: CPT | Performed by: FAMILY MEDICINE

## 2025-08-14 PROCEDURE — 99214 OFFICE O/P EST MOD 30 MIN: CPT | Performed by: FAMILY MEDICINE

## 2025-08-14 PROCEDURE — G2211 COMPLEX E/M VISIT ADD ON: HCPCS | Performed by: FAMILY MEDICINE

## 2025-08-14 PROCEDURE — 3079F DIAST BP 80-89 MM HG: CPT | Performed by: FAMILY MEDICINE

## 2025-08-14 RX ORDER — OLMESARTAN MEDOXOMIL 20 MG/1
20 TABLET ORAL DAILY
Qty: 90 TABLET | Refills: 3 | Status: SHIPPED | OUTPATIENT
Start: 2025-08-14

## 2025-08-14 ASSESSMENT — ENCOUNTER SYMPTOMS
COUGH: 0
FEVER: 0
DIZZINESS: 1

## 2025-08-15 ENCOUNTER — HOSPITAL ENCOUNTER (OUTPATIENT)
Dept: CARDIOLOGY | Facility: HOSPITAL | Age: 70
Discharge: HOME | End: 2025-08-15
Payer: MEDICARE

## 2025-08-15 DIAGNOSIS — R55 SYNCOPE AND COLLAPSE: ICD-10-CM

## 2025-08-15 DIAGNOSIS — Z95.818 STATUS POST PLACEMENT OF IMPLANTABLE LOOP RECORDER: ICD-10-CM

## 2025-08-15 PROCEDURE — 93298 REM INTERROG DEV EVAL SCRMS: CPT

## 2025-08-15 PROCEDURE — 93298 REM INTERROG DEV EVAL SCRMS: CPT | Performed by: INTERNAL MEDICINE

## 2025-11-05 ENCOUNTER — APPOINTMENT (OUTPATIENT)
Dept: UROLOGY | Facility: CLINIC | Age: 70
End: 2025-11-05
Payer: MEDICARE

## 2025-11-19 ENCOUNTER — APPOINTMENT (OUTPATIENT)
Dept: CARDIOLOGY | Facility: CLINIC | Age: 70
End: 2025-11-19
Payer: MEDICARE

## 2026-01-16 ENCOUNTER — APPOINTMENT (OUTPATIENT)
Dept: PRIMARY CARE | Facility: CLINIC | Age: 71
End: 2026-01-16
Payer: MEDICARE

## 2026-04-27 ENCOUNTER — APPOINTMENT (OUTPATIENT)
Dept: UROLOGY | Facility: CLINIC | Age: 71
End: 2026-04-27
Payer: MEDICARE